# Patient Record
Sex: FEMALE | Race: WHITE | NOT HISPANIC OR LATINO | Employment: FULL TIME | URBAN - METROPOLITAN AREA
[De-identification: names, ages, dates, MRNs, and addresses within clinical notes are randomized per-mention and may not be internally consistent; named-entity substitution may affect disease eponyms.]

---

## 2017-03-02 ENCOUNTER — TELEPHONE (OUTPATIENT)
Dept: INTERNAL MEDICINE | Facility: CLINIC | Age: 32
End: 2017-03-02

## 2017-03-02 NOTE — TELEPHONE ENCOUNTER
Prior Authorization Request    1. Prior Authorization for the medication gabapentin        Requesting Provider:Jesus Macias          Pt name: Rachana Senior        Pt : 1985        Pt MRN: [id number]        Last Office Visit: 2016           Insurance: Payor: MEDICA / Plan: MEDICA [place] / Product Type: HMO /         Insurance ID Number: [unfilled]        Prior Auth Contact Phone number:            To be completed by provider:     2.   Refuse or Start Prior Auth:  Please start Prior Auth.       If requesting prior auth initiation:     Reasons why other medications are not adequate substitutions:     send info that incld dx for this med- both anxiety and chronic pain

## 2017-03-02 NOTE — TELEPHONE ENCOUNTER
Prior authorization    Medication name gabapentin  Insurance medica  Insurance ID number [id number]  Prior authorization faxed through cover my meds

## 2017-03-29 DIAGNOSIS — F41.1 GENERALIZED ANXIETY DISORDER: ICD-10-CM

## 2017-03-29 DIAGNOSIS — M54.50 BILATERAL LOW BACK PAIN WITHOUT SCIATICA, UNSPECIFIED CHRONICITY: ICD-10-CM

## 2017-03-29 NOTE — TELEPHONE ENCOUNTER
gabapentin (NEURONTIN) 300 MG capsule      Last Written Prescription Date:  10/11/16  Last Fill Quantity: 270,   # refills: 5  Last Office Visit with [facility], [facility] or [facility] prescribing provider: 10/11/16  Future Office visit:       Routing refill request to provider for review/approval because:  Drug not on the [facility], [facility] or [facility] refill protocol or controlled substance

## 2017-03-29 NOTE — LETTER
[facility]  [address] [address]  [phone] (appt)  [phone] (nurse line)    April 6, 2017      Rachana Senior                                                                                                                                [address] [place] [address]              Dear Rachana,    In reviewing your recent refill request for Gabapentin, it was noted that you are due for the following:     Follow-up appointment with your physician within the next 30 days     Approval for a 30-day supply of the medication has been sent to your pharmacy.  Additional refills will be approved during your follow up visit.     Please contact our office at [phone] to schedule your doctor's appointment.          Sincerely,      [facility]

## 2017-03-30 RX ORDER — GABAPENTIN 300 MG/1
900 CAPSULE ORAL 3 TIMES DAILY
Qty: 270 CAPSULE | Refills: 0 | Status: SHIPPED | OUTPATIENT
Start: 2017-03-30 | End: 2017-04-20

## 2017-04-03 RX ORDER — TRIAMCINOLONE ACETONIDE 1 MG/G
CREAM TOPICAL 2 TIMES DAILY
Qty: 45 G | Refills: 6 | OUTPATIENT
Start: 2017-04-03

## 2017-04-03 NOTE — TELEPHONE ENCOUNTER
triamcinolone 0.1 cream 15gm      Last Written Prescription Date: na  Last Fill Quantity: na,  # refills: na   Last Office Visit with [facility] or [facility] prescribing provider: 10/11/16

## 2017-04-03 NOTE — TELEPHONE ENCOUNTER
Routing RX to PCP due to TRIAMCINOLONE ACETONIDE 0.1 % EX CREA  Is not a current medication on pt's med list.

## 2017-04-20 ENCOUNTER — OFFICE VISIT (OUTPATIENT)
Dept: INTERNAL MEDICINE | Facility: CLINIC | Age: 32
End: 2017-04-20
Payer: COMMERCIAL

## 2017-04-20 VITALS
OXYGEN SATURATION: 98 % | WEIGHT: 118.4 LBS | DIASTOLIC BLOOD PRESSURE: 90 MMHG | BODY MASS INDEX: 18.58 KG/M2 | SYSTOLIC BLOOD PRESSURE: 128 MMHG | TEMPERATURE: 98.3 F | HEIGHT: 67 IN | HEART RATE: 87 BPM

## 2017-04-20 DIAGNOSIS — L20.9 ATOPIC DERMATITIS, UNSPECIFIED TYPE: ICD-10-CM

## 2017-04-20 DIAGNOSIS — J45.30 MILD PERSISTENT ASTHMA WITHOUT COMPLICATION: ICD-10-CM

## 2017-04-20 DIAGNOSIS — J30.2 SEASONAL ALLERGIC RHINITIS, UNSPECIFIED ALLERGIC RHINITIS TRIGGER: ICD-10-CM

## 2017-04-20 DIAGNOSIS — M54.50 BILATERAL LOW BACK PAIN WITHOUT SCIATICA, UNSPECIFIED CHRONICITY: ICD-10-CM

## 2017-04-20 DIAGNOSIS — F41.1 GENERALIZED ANXIETY DISORDER: Primary | ICD-10-CM

## 2017-04-20 PROCEDURE — 99214 OFFICE O/P EST MOD 30 MIN: CPT | Performed by: INTERNAL MEDICINE

## 2017-04-20 RX ORDER — FLUTICASONE PROPIONATE 50 MCG
1-2 SPRAY, SUSPENSION (ML) NASAL DAILY
Qty: 1 BOTTLE | Refills: 11 | Status: SHIPPED | OUTPATIENT
Start: 2017-04-20 | End: 2018-04-11

## 2017-04-20 RX ORDER — TRIAMCINOLONE ACETONIDE 1 MG/G
CREAM TOPICAL
COMMUNITY
Start: 2017-03-01 | End: 2017-04-20

## 2017-04-20 RX ORDER — CITALOPRAM HYDROBROMIDE 20 MG/1
TABLET ORAL
Qty: 30 TABLET | Refills: 1 | Status: SHIPPED | OUTPATIENT
Start: 2017-04-20 | End: 2017-06-07

## 2017-04-20 RX ORDER — GABAPENTIN 300 MG/1
900 CAPSULE ORAL 3 TIMES DAILY
Qty: 270 CAPSULE | Refills: 5 | Status: SHIPPED | OUTPATIENT
Start: 2017-04-20 | End: 2017-09-12

## 2017-04-20 RX ORDER — TRIAMCINOLONE ACETONIDE 1 MG/ML
LOTION TOPICAL
Qty: 60 ML | Refills: 11 | Status: SHIPPED | OUTPATIENT
Start: 2017-04-20 | End: 2019-10-22

## 2017-04-20 ASSESSMENT — ANXIETY QUESTIONNAIRES
7. FEELING AFRAID AS IF SOMETHING AWFUL MIGHT HAPPEN: SEVERAL DAYS
5. BEING SO RESTLESS THAT IT IS HARD TO SIT STILL: SEVERAL DAYS
2. NOT BEING ABLE TO STOP OR CONTROL WORRYING: NEARLY EVERY DAY
3. WORRYING TOO MUCH ABOUT DIFFERENT THINGS: NEARLY EVERY DAY
6. BECOMING EASILY ANNOYED OR IRRITABLE: MORE THAN HALF THE DAYS
1. FEELING NERVOUS, ANXIOUS, OR ON EDGE: MORE THAN HALF THE DAYS
IF YOU CHECKED OFF ANY PROBLEMS ON THIS QUESTIONNAIRE, HOW DIFFICULT HAVE THESE PROBLEMS MADE IT FOR YOU TO DO YOUR WORK, TAKE CARE OF THINGS AT HOME, OR GET ALONG WITH OTHER PEOPLE: SOMEWHAT DIFFICULT
GAD7 TOTAL SCORE: 13

## 2017-04-20 ASSESSMENT — PATIENT HEALTH QUESTIONNAIRE - PHQ9: 5. POOR APPETITE OR OVEREATING: SEVERAL DAYS

## 2017-04-20 NOTE — NURSING NOTE
"Chief Complaint   Patient presents with     Asthma       Initial /90  Pulse 87  Temp 98.3  F (36.8  C) (Oral)  Ht 5' 6.5\" (1.689 m)  Wt 118 lb 6.4 oz (53.7 kg)  LMP 04/12/2017  SpO2 98%  BMI 18.82 kg/m2 Estimated body mass index is 18.82 kg/(m^2) as calculated from the following:    Height as of this encounter: 5' 6.5\" (1.689 m).    Weight as of this encounter: 118 lb 6.4 oz (53.7 kg).  Medication Reconciliation: complete    "

## 2017-04-20 NOTE — PROGRESS NOTES
"  SUBJECTIVE:                                                    Rachana Senior is a 31 year old female who presents to clinic today for the following health issues:    Asthma Follow-Up    Was ACT completed today?    Yes    ACT Total Scores 4/20/2017   ACT TOTAL SCORE (Goal Greater than or Equal to 20) 16   In the past 12 months, how many times did you visit the emergency room for your asthma without being admitted to the hospital? 0   In the past 12 months, how many times were you hospitalized overnight because of your asthma? 0       Recent asthma triggers that patient is dealing with: None  Hasn't been that compliant with using her controller medication       Amount of exercise or physical activity: None    Problems taking medications regularly: No    Medication side effects: none    Diet: regular (no restrictions)      Anxiety Follow-Up    Status since last visit: Worsened     Other associated symptoms:None    Complicating factors:   Significant life event: yes- constant stress regarding kids  Current substance abuse: None  Depression symptoms: No  JESSICA-7 SCORE 7/28/2016 4/20/2017   Total Score 20 13        GAD7     Problem list and histories reviewed & adjusted, as indicated.  Additional history: as documented    Labs reviewed in EPIC    Reviewed and updated as needed this visit by clinical staff  Tobacco  Allergies  Soc Hx      Reviewed and updated as needed this visit by Provider         ROS:  Constitutional, HEENT, cardiovascular, pulmonary, gi and gu systems are negative, except as otherwise noted.    OBJECTIVE:                                                    /90  Pulse 87  Temp 98.3  F (36.8  C) (Oral)  Ht 5' 6.5\" (1.689 m)  Wt 118 lb 6.4 oz (53.7 kg)  LMP 04/12/2017  SpO2 98%  BMI 18.82 kg/m2  Body mass index is 18.82 kg/(m^2).  GENERAL APPEARANCE: alert and no distress  HENT: nose and mouth without ulcers or lesions  NECK: no adenopathy, no asymmetry, masses, or scars and thyroid normal " to palpation  RESP: lungs clear to auscultation - no rales, rhonchi or wheezes  CV: regular rates and rhythm, normal S1 S2, no S3 or S4 and no murmur, click or rub  MS: extremities normal- no gross deformities noted  PSYCH: affect normal/bright and anxious    Diagnostic test results:  none      ASSESSMENT/PLAN:                                                    1. Generalized anxiety disorder  Start SSRI  F/u 1-2 mo   - citalopram (CELEXA) 20 MG tablet; Take 1/2 tablet (10 mg) for 1-2 weeks, then increase to 1 tablet orally daily  Dispense: 30 tablet; Refill: 1  - gabapentin (NEURONTIN) 300 MG capsule; Take 3 capsules (900 mg) by mouth 3 times daily  Dispense: 270 capsule; Refill: 5    2. Mild persistent asthma without complication  Needs to be more compliant with use of med  Recheck later this year  - mometasone-formoterol (DULERA) 200-5 MCG/ACT oral inhaler; Inhale 2 puffs into the lungs 2 times daily  Dispense: 1 Inhaler; Refill: 5    3. Seasonal allergic rhinitis, unspecified allergic rhinitis trigger  - fluticasone (FLONASE) 50 MCG/ACT spray; Spray 1-2 sprays into both nostrils daily  Dispense: 1 Bottle; Refill: 11    4. Bilateral low back pain without sciatica, unspecified chronicity  - gabapentin (NEURONTIN) 300 MG capsule; Take 3 capsules (900 mg) by mouth 3 times daily  Dispense: 270 capsule; Refill: 5      Follow up with Provider - as above     Jesus Macias MD  [facility]

## 2017-04-20 NOTE — MR AVS SNAPSHOT
"              After Visit Summary   2017    Rachana Senior    MRN: [id number]           Patient Information     Date Of Birth          1985        Visit Information        Provider Department      2017 10:40 AM Jesus Macias MD [facility]        Today's Diagnoses     Generalized anxiety disorder    -  1       Follow-ups after your visit        Who to contact     If you have questions or need follow up information about today's clinic visit or your schedule please contact [facility] directly at [phone].  Normal or non-critical lab and imaging results will be communicated to you by [employer], letter or phone within 4 business days after the clinic has received the results. If you do not hear from us within 7 days, please contact the clinic through [employer] or phone. If you have a critical or abnormal lab result, we will notify you by phone as soon as possible.  Submit refill requests through [employer] or call your pharmacy and they will forward the refill request to us. Please allow 3 business days for your refill to be completed.          Additional Information About Your Visit        MyChart Information     [employer] lets you send messages to your doctor, view your test results, renew your prescriptions, schedule appointments and more. To sign up, go to [email or web address] . Click on \"Log in\" on the left side of the screen, which will take you to the Welcome page. Then click on \"Sign up Now\" on the right side of the page.     You will be asked to enter the access code listed below, as well as some personal information. Please follow the directions to create your username and password.     Your access code is: [email or web address]  Expires: 2017 11:23 AM     Your access code will  in 90 days. If you need help or a new code, please call your [facility] or [phone].        Care EveryWhere ID     This is your Care EveryWhere ID. This " "could be used by other organizations to access your [facility] medical records  [id number]        Your Vitals Were     Pulse Temperature Height Last Period Pulse Oximetry BMI (Body Mass Index)    87 98.3  F (36.8  C) (Oral) 5' 6.5\" (1.689 m) 04/12/2017 98% 18.82 kg/m2       Blood Pressure from Last 3 Encounters:   04/20/17 128/90   10/11/16 106/60   04/22/16 119/76    Weight from Last 3 Encounters:   04/20/17 118 lb 6.4 oz (53.7 kg)   10/11/16 124 lb 14.4 oz (56.7 kg)   04/22/16 120 lb (54.4 kg)              Today, you had the following     No orders found for display         Today's Medication Changes          These changes are accurate as of: 4/20/17 11:23 AM.  If you have any questions, ask your nurse or doctor.               Start taking these medicines.        Dose/Directions    citalopram 20 MG tablet   Commonly known as:  celeXA   Used for:  Generalized anxiety disorder   Started by:  Jesus Macias MD        Take 1/2 tablet (10 mg) for 1-2 weeks, then increase to 1 tablet orally daily   Quantity:  30 tablet   Refills:  1            Where to get your medicines      These medications were sent to [facility] [address] [place] [address] [address] AT [facility] [address] [place]  [address] , [facility] [address]     Phone:  [phone]     citalopram 20 MG tablet                Primary Care Provider Office Phone # Fax #    Jesus Macias [phone]       [facility] [address]  [facility] [address]        Thank you!     Thank you for choosing [facility]  for your care. Our goal is always to provide you with excellent care. Hearing back from our patients is one way we can continue to improve our services. Please take a few minutes to complete the written survey that you may receive in the mail after your visit with us. Thank you!             Your Updated Medication List - Protect others around you: Learn " how to safely use, store and throw away your medicines at www.disposemymeds.org.          This list is accurate as of: 4/20/17 11:23 AM.  Always use your most recent med list.                   Brand Name Dispense Instructions for use    citalopram 20 MG tablet    celeXA    30 tablet    Take 1/2 tablet (10 mg) for 1-2 weeks, then increase to 1 tablet orally daily       fluticasone 50 MCG/ACT spray    FLONASE    1 Bottle    Spray 1-2 sprays into both nostrils daily       gabapentin 300 MG capsule    NEURONTIN    270 capsule    Take 3 capsules (900 mg) by mouth 3 times daily       ketotifen 0.025 % Soln ophthalmic solution    ZADITOR    1 Bottle    Place 1 drop into both eyes every 12 hours       mometasone-formoterol 200-5 MCG/ACT oral inhaler    DULERA    1 Inhaler    Inhale 2 puffs into the lungs 2 times daily       triamcinolone 0.1 % cream    KENALOG

## 2017-04-21 ASSESSMENT — ASTHMA QUESTIONNAIRES: ACT_TOTALSCORE: 16

## 2017-04-21 ASSESSMENT — PATIENT HEALTH QUESTIONNAIRE - PHQ9: SUM OF ALL RESPONSES TO PHQ QUESTIONS 1-9: 5

## 2017-04-21 ASSESSMENT — ANXIETY QUESTIONNAIRES: GAD7 TOTAL SCORE: 13

## 2017-05-29 ENCOUNTER — HOSPITAL ENCOUNTER (EMERGENCY)
Facility: CLINIC | Age: 32
Discharge: HOME | End: 2017-05-29
Attending: CLINICAL NURSE SPECIALIST | Admitting: CLINICAL NURSE SPECIALIST
Payer: COMMERCIAL

## 2017-05-29 VITALS
SYSTOLIC BLOOD PRESSURE: 123 MMHG | BODY MASS INDEX: 19.29 KG/M2 | DIASTOLIC BLOOD PRESSURE: 68 MMHG | WEIGHT: 120 LBS | OXYGEN SATURATION: 99 % | HEIGHT: 66 IN | TEMPERATURE: 98.6 F

## 2017-05-29 DIAGNOSIS — R20.2 PARESTHESIAS: ICD-10-CM

## 2017-05-29 PROCEDURE — 99282 EMERGENCY DEPT VISIT SF MDM: CPT

## 2017-05-29 RX ORDER — NAPROXEN 500 MG/1
500 TABLET ORAL 2 TIMES DAILY PRN
Qty: 30 TABLET | Refills: 1 | Status: SHIPPED | OUTPATIENT
Start: 2017-05-29 | End: 2017-12-12

## 2017-05-29 NOTE — LETTER
EMERGENCY DEPARTMENT  [address] [address]  [phone]    May 29, 2017    Rachana Senior  [address] [place] [address]  [phone] (home) none (work)    : 1985      To Whom it may concern:    Rachana Senior was seen in our Emergency Department today, May 29, 2017.  I expect her condition to improve over the next 1 day.  She may return to work when improved. Must wear right wrist splint for one week while working.    Sincerely,        Staci Soler

## 2017-05-29 NOTE — ED AVS SNAPSHOT
Emergency Department    [address] [place] [address]    Phone:  [phone]    Fax:  [phone]                                       Rachana Senior   MRN: [id number]    Department:   Emergency Department   Date of Visit:  5/29/2017           After Visit Summary Signature Page     I have received my discharge instructions, and my questions have been answered. I have discussed any challenges I see with this plan with the nurse or doctor.    ..........................................................................................................................................  Patient/Patient Representative Signature      ..........................................................................................................................................  Patient Representative Print Name and Relationship to Patient    ..................................................               ................................................  Date                                            Time    ..........................................................................................................................................  Reviewed by Signature/Title    ...................................................              ..............................................  Date                                                            Time

## 2017-05-29 NOTE — ED AVS SNAPSHOT
"  Emergency Department    [address] [facility] [address]    Phone:  [phone]    Fax:  [phone]                                       Rachana Senior   MRN: [id number]    Department:   Emergency Department   Date of Visit:  5/29/2017           Patient Information     Date Of Birth          1985        Your diagnoses for this visit were:     Paresthesias right hand       You were seen by Staci Soler APRN CNP.      Follow-up Information     Follow up with Jesus Macias MD In 1 week.    Specialty:  Internal Medicine    Why:  As needed    Contact information:    [facility]  [address] [facility] [address] [phone]          Discharge Instructions         Paraesthesias  Paraesthesia refers to a burning or prickling sensation that is sometimes felt in the hands, arms, legs or feet. It can also occur in other parts of the body. It can also feel like tingling or numbness, skin crawling, or itching. The feeling is not comfortable, but it is not painful. (The \"pins and needles\" feeling that happens when a foot or hand \"falls asleep\" is a temporary paraesthesia.)  Paraesthesias that last or come and go may be caused by medical issues that need to be treated. These include stroke, bulging disk (pressing on a nerve), a trapped nerve), vitamin deficiencies, or even certain medicines.  Tests are often done. These tests may include blood tests, X-ray, CT (computerized tomography) scan, or a muscle test (electromyography). Depending on the cause, treatment may include physical therapy.  Home care    Tell the healthcare provider about all medicines you take. This includes prescription and over-the-counter medicines, vitamins, and herbs. Ask if any of the medicines may be causing your problems. Do not make any changes to prescription medicines without talking to your healthcare provider first.    You may be prescribed medicines to help relieve the tingling " feeling or for pain. Take all medicines as directed.    A numb hand or foot may be more prone to injury. To help protect it:    Always use oven mitts.    Test water with an unaffected hand or foot.    Use caution when trimming nails. File sharp areas.    Wear shoes that fit well to avoid pressure points, blisters, and ulcers.    Inspect your hands and feet carefully (including the soles of your feet and between your toes) at least once a week. If you see red areas, sores, or other problems, tell your healthcare provider.  Follow-up care  Follow up with your doctor or as advised by our staff. You may need further testing or evaluation.  When to seek medical advice  Call your healthcare provider right away if any of the following occur:    Numbness or weakness of the face, one arm, or one leg    Slurred speech, confusion, trouble speaking, walking, or seeing    Severe headache, fainting spell, dizziness, or seizure    Chest, arm, neck, or upper back pain    Loss of bladder or bowel control    Open wound with redness, swelling, or pus    1572-7264 [employer]. [address] [place] [address]. All rights reserved. This information is not intended as a substitute for professional medical care. Always follow your healthcare professional's instructions.          Discharge References/Attachments     CARPAL TUNNEL SYNDROME (ENGLISH)      24 Hour Appointment Hotline       To make an appointment at any [facility] clinic, call [phone] ([phone]). If you don't have a family doctor or clinic, we will help you find one. [facility] clinics are conveniently located to serve the needs of you and your family.             Review of your medicines      START taking        Dose / Directions Last dose taken    naproxen 500 MG tablet   Commonly known as:  NAPROSYN   Dose:  500 mg   Quantity:  30 tablet        Take 1 tablet (500 mg) by mouth 2 times daily as needed for moderate pain   Refills:  1           Our records show that you are taking the medicines listed below. If these are incorrect, please call your family doctor or clinic.        Dose / Directions Last dose taken    citalopram 20 MG tablet   Commonly known as:  celeXA   Quantity:  30 tablet        Take 1/2 tablet (10 mg) for 1-2 weeks, then increase to 1 tablet orally daily   Refills:  1        fluticasone 50 MCG/ACT spray   Commonly known as:  FLONASE   Dose:  1-2 spray   Quantity:  1 Bottle        Spray 1-2 sprays into both nostrils daily   Refills:  11        gabapentin 300 MG capsule   Commonly known as:  NEURONTIN   Dose:  900 mg   Quantity:  270 capsule        Take 3 capsules (900 mg) by mouth 3 times daily   Refills:  5        ketotifen 0.025 % Soln ophthalmic solution   Commonly known as:  ZADITOR   Dose:  1 drop   Quantity:  1 Bottle        Place 1 drop into both eyes every 12 hours   Refills:  11        mometasone-formoterol 200-5 MCG/ACT oral inhaler   Commonly known as:  DULERA   Dose:  2 puff   Quantity:  1 Inhaler        Inhale 2 puffs into the lungs 2 times daily   Refills:  5        triamcinolone 0.1 % lotion   Commonly known as:  KENALOG   Quantity:  60 mL        Apply sparingly to affected area two times daily as needed.   Refills:  11                Prescriptions were sent or printed at these locations (1 Prescription)                   Other Prescriptions                Printed at Department/Unit printer (1 of 1)         naproxen (NAPROSYN) 500 MG tablet                Orders Needing Specimen Collection     None      Pending Results     No orders found from 5/27/2017 to 5/30/2017.            Pending Culture Results     No orders found from 5/27/2017 to 5/30/2017.            Pending Results Instructions     If you had any lab results that were not finalized at the time of your Discharge, you can call the ED Lab Result RN at [phone]. You will be contacted by this team for any positive Lab results or changes in treatment. The nurses  are available 7 days a week from 10A to 6:30P.  You can leave a message 24 hours per day and they will return your call.        Test Results From Your Hospital Stay               Clinical Quality Measure: Blood Pressure Screening     Your blood pressure was checked while you were in the emergency department today. The last reading we obtained was  BP: 123/68 . Please read the guidelines below about what these numbers mean and what you should do about them.  If your systolic blood pressure (the top number) is less than 120 and your diastolic blood pressure (the bottom number) is less than 80, then your blood pressure is normal. There is nothing more that you need to do about it.  If your systolic blood pressure (the top number) is 120-139 or your diastolic blood pressure (the bottom number) is 80-89, your blood pressure may be higher than it should be. You should have your blood pressure rechecked within a year by a primary care provider.  If your systolic blood pressure (the top number) is 140 or greater or your diastolic blood pressure (the bottom number) is 90 or greater, you may have high blood pressure. High blood pressure is treatable, but if left untreated over time it can put you at risk for heart attack, stroke, or kidney failure. You should have your blood pressure rechecked by a primary care provider within the next 4 weeks.  If your provider in the emergency department today gave you specific instructions to follow-up with your doctor or provider even sooner than that, you should follow that instruction and not wait for up to 4 weeks for your follow-up visit.        Thank you for choosing [facility]       Thank you for choosing [facility] for your care. Our goal is always to provide you with excellent care. Hearing back from our patients is one way we can continue to improve our services. Please take a few minutes to complete the written survey that you may receive in the mail after you visit with us. Thank  "you!        [employer] Information     [employer] lets you send messages to your doctor, view your test results, renew your prescriptions, schedule appointments and more. To sign up, go to [email or web address] . Click on \"Log in\" on the left side of the screen, which will take you to the Welcome page. Then click on \"Sign up Now\" on the right side of the page.     You will be asked to enter the access code listed below, as well as some personal information. Please follow the directions to create your username and password.     Your access code is: [email or web address]  Expires: 2017 11:23 AM     Your access code will  in 90 days. If you need help or a new code, please call your [facility] clinic or [phone].        Care EveryWhere ID     This is your Care EveryWhere ID. This could be used by other organizations to access your [facility] medical records  [id number]        After Visit Summary       This is your record. Keep this with you and show to your community pharmacist(s) and doctor(s) at your next visit.                  "

## 2017-05-30 NOTE — DISCHARGE INSTRUCTIONS
"  Paraesthesias  Paraesthesia refers to a burning or prickling sensation that is sometimes felt in the hands, arms, legs or feet. It can also occur in other parts of the body. It can also feel like tingling or numbness, skin crawling, or itching. The feeling is not comfortable, but it is not painful. (The \"pins and needles\" feeling that happens when a foot or hand \"falls asleep\" is a temporary paraesthesia.)  Paraesthesias that last or come and go may be caused by medical issues that need to be treated. These include stroke, bulging disk (pressing on a nerve), a trapped nerve), vitamin deficiencies, or even certain medicines.  Tests are often done. These tests may include blood tests, X-ray, CT (computerized tomography) scan, or a muscle test (electromyography). Depending on the cause, treatment may include physical therapy.  Home care    Tell the healthcare provider about all medicines you take. This includes prescription and over-the-counter medicines, vitamins, and herbs. Ask if any of the medicines may be causing your problems. Do not make any changes to prescription medicines without talking to your healthcare provider first.    You may be prescribed medicines to help relieve the tingling feeling or for pain. Take all medicines as directed.    A numb hand or foot may be more prone to injury. To help protect it:    Always use oven mitts.    Test water with an unaffected hand or foot.    Use caution when trimming nails. File sharp areas.    Wear shoes that fit well to avoid pressure points, blisters, and ulcers.    Inspect your hands and feet carefully (including the soles of your feet and between your toes) at least once a week. If you see red areas, sores, or other problems, tell your healthcare provider.  Follow-up care  Follow up with your doctor or as advised by our staff. You may need further testing or evaluation.  When to seek medical advice  Call your healthcare provider right away if any of the " following occur:    Numbness or weakness of the face, one arm, or one leg    Slurred speech, confusion, trouble speaking, walking, or seeing    Severe headache, fainting spell, dizziness, or seizure    Chest, arm, neck, or upper back pain    Loss of bladder or bowel control    Open wound with redness, swelling, or pus    7814-7000 [employer]. [address] [place] [address]. All rights reserved. This information is not intended as a substitute for professional medical care. Always follow your healthcare professional's instructions.

## 2017-05-30 NOTE — ED PROVIDER NOTES
"  History   Chief Complaint:  Hand Pain     HPI   Rachana Senior is a 31 year old female who presents to the emergency department for evaluation of right hand pain. The patient indicates that about a week ago she hit her right hand on a steel rack about a week ago and she started having discomfort a few days later. She notes that she is right handed and she uses the hand often. She denies any other injuries.     Allergies:  NKDA    Medications:    Celexa   Flonase   Dulera   Gabapentin   Kenalog   Zaditor     Past Medical History:    Depression   Generalized anxiety   Seizures   Asthma     Past Surgical History:    ENT surgery   Gyn surgery     Family History:    Cancer    Social History:  Current everyday smoker   Negative for alcohol use.  Marital Status:  Single     Review of Systems   Musculoskeletal:        Hand pain   All other systems reviewed and are negative.    Physical Exam   First Vitals:  BP: 123/68  Heart Rate: 77  Temp: 98.6  F (37  C)  Height: 167.6 cm (5' 6\")  Weight: 54.4 kg (120 lb)  SpO2: 99 %    Physical Exam  Physical Exam   Constitutional: Pt appears well-developed and well-nourished. Non toxic appearing.   ENT: Oropharynx is moist.   Eyes: EOMs intact. Pupils are equal, round, and reactive to light.    Cardiovascular: Regular rate and rhythm.   Pulmonary/Chest: No respiratory distress.     Musc: Able to extend and flex fingers without difficulty, medial, radial and ulnar nerve intact, 2+ radial pulses intact, mildly diminished on the lateral side of left index finger, positive phalen's test   Neurological: No focal deficits.   Skin: Skin is warm, dry.    Emergency Department Course   Emergency Department Course:  Nursing notes and vitals reviewed. I performed an exam of the patient as documented above.     Findings and plan explained to the Patient. Patient discharged home with instructions regarding supportive care, medications, and reasons to return. The importance of close follow-up was " reviewed. The patient was prescribed Naproxen.     Impression & Plan    Medical Decision Making:  Rachana Senior is a 31 year old female presents for evaluation of right hand/finger paresthesias.  Signs and symptoms are consistent with a possible inflammation from hit of hand against bag powell versus carpal tunnel syndrome.  A broad differential was considered including sprain, strain, fracture, tendon rupture, nerve impingement/compromise, referred pain. Supportive outpatient management is indicated.  Rest, ice, and elevation treatment was discussed with the patient.  Velcro wrist splint for comfort.  Close follow-up with patient's primary care physician per discharge precautions in one week as needed. Paresthesias discharge instructions given for home.     Diagnosis:    ICD-10-CM    1. Paresthesias R20.2     right hand     Discharge Medications:  New Prescriptions    NAPROXEN (NAPROSYN) 500 MG TABLET    Take 1 tablet (500 mg) by mouth 2 times daily as needed for moderate pain     Danae Ruiz  5/29/2017    EMERGENCY DEPARTMENT    I, Danae Said, am serving as a scribe on 5/29/2017 at 9:42 PM to personally document services performed by Staci Soler, NP based on my observations and the provider's statements to me.          Staci Soler, APRN CNP  05/29/17 9589

## 2017-06-07 DIAGNOSIS — F41.1 GENERALIZED ANXIETY DISORDER: ICD-10-CM

## 2017-06-07 RX ORDER — CITALOPRAM HYDROBROMIDE 20 MG/1
TABLET ORAL
Qty: 30 TABLET | Refills: 9 | Status: SHIPPED | OUTPATIENT
Start: 2017-06-07 | End: 2017-09-12

## 2017-07-01 ENCOUNTER — HEALTH MAINTENANCE LETTER (OUTPATIENT)
Age: 32
End: 2017-07-01

## 2017-08-20 DIAGNOSIS — M54.50 BILATERAL LOW BACK PAIN WITHOUT SCIATICA, UNSPECIFIED CHRONICITY: ICD-10-CM

## 2017-08-20 DIAGNOSIS — F41.1 GENERALIZED ANXIETY DISORDER: ICD-10-CM

## 2017-08-22 RX ORDER — GABAPENTIN 300 MG/1
CAPSULE ORAL
Qty: 270 CAPSULE | Refills: 0 | OUTPATIENT
Start: 2017-08-22

## 2017-08-22 NOTE — TELEPHONE ENCOUNTER
Gabapentin      Last Written Prescription Date:  4/20/17  Last Fill Quantity: 270,   # refills: 5  [facility] provider: 4/20/17  Future Office visit:       Routing refill request to provider for review/approval because:  Drug not on the [facility] refill protocol or controlled substance

## 2017-08-22 NOTE — TELEPHONE ENCOUNTER
Last fill (from last supply) should be now. New rx not fillable until sept  Pt hasn't f/u -so a visit is needed

## 2017-09-10 ENCOUNTER — TELEPHONE (OUTPATIENT)
Dept: INTERNAL MEDICINE | Facility: CLINIC | Age: 32
End: 2017-09-10

## 2017-09-10 NOTE — TELEPHONE ENCOUNTER
Patient is returning a phone call from the clinic. There are no notes in her chart. Please call patient to discuss. She can be reached tomorrow, 9/11/17, before 2pm or after 3pm.    Luanne ULRICH  Central Scheduler

## 2017-09-11 NOTE — TELEPHONE ENCOUNTER
Returned patients call I am not sure who called her or why.  Left message to call back if she has any more information or any questions

## 2017-09-12 ENCOUNTER — OFFICE VISIT (OUTPATIENT)
Dept: INTERNAL MEDICINE | Facility: CLINIC | Age: 32
End: 2017-09-12
Payer: COMMERCIAL

## 2017-09-12 VITALS
RESPIRATION RATE: 16 BRPM | HEIGHT: 66 IN | DIASTOLIC BLOOD PRESSURE: 70 MMHG | WEIGHT: 127 LBS | TEMPERATURE: 98.9 F | OXYGEN SATURATION: 99 % | HEART RATE: 100 BPM | BODY MASS INDEX: 20.41 KG/M2 | SYSTOLIC BLOOD PRESSURE: 120 MMHG

## 2017-09-12 DIAGNOSIS — J30.89 CHRONIC ALLERGIC RHINITIS DUE TO OTHER ALLERGIC TRIGGER, UNSPECIFIED SEASONALITY: Primary | ICD-10-CM

## 2017-09-12 DIAGNOSIS — M54.50 BILATERAL LOW BACK PAIN WITHOUT SCIATICA, UNSPECIFIED CHRONICITY: ICD-10-CM

## 2017-09-12 DIAGNOSIS — F41.1 GENERALIZED ANXIETY DISORDER: ICD-10-CM

## 2017-09-12 DIAGNOSIS — J45.30 MILD PERSISTENT ASTHMA WITHOUT COMPLICATION: ICD-10-CM

## 2017-09-12 DIAGNOSIS — H10.45 CHRONIC ALLERGIC CONJUNCTIVITIS: ICD-10-CM

## 2017-09-12 PROCEDURE — 99214 OFFICE O/P EST MOD 30 MIN: CPT | Performed by: INTERNAL MEDICINE

## 2017-09-12 RX ORDER — CITALOPRAM HYDROBROMIDE 40 MG/1
40 TABLET ORAL DAILY
Qty: 90 TABLET | Refills: 1 | Status: SHIPPED | OUTPATIENT
Start: 2017-09-12 | End: 2017-12-12

## 2017-09-12 RX ORDER — ALBUTEROL SULFATE 90 UG/1
2 AEROSOL, METERED RESPIRATORY (INHALATION) EVERY 6 HOURS PRN
Qty: 1 INHALER | Refills: 11 | Status: SHIPPED | OUTPATIENT
Start: 2017-09-12 | End: 2019-10-22

## 2017-09-12 RX ORDER — GABAPENTIN 300 MG/1
900 CAPSULE ORAL 3 TIMES DAILY
Qty: 270 CAPSULE | Refills: 1 | Status: SHIPPED | OUTPATIENT
Start: 2017-09-12 | End: 2017-11-03

## 2017-09-12 RX ORDER — CROMOLYN SODIUM 40 MG/ML
1 SOLUTION/ DROPS OPHTHALMIC 4 TIMES DAILY
Qty: 1 BOTTLE | Refills: 11 | Status: SHIPPED | OUTPATIENT
Start: 2017-09-12 | End: 2018-11-14

## 2017-09-12 ASSESSMENT — PATIENT HEALTH QUESTIONNAIRE - PHQ9: SUM OF ALL RESPONSES TO PHQ QUESTIONS 1-9: 1

## 2017-09-12 NOTE — PROGRESS NOTES
"  SUBJECTIVE:   Rachana Senior is a 32 year old female who presents to clinic today for the following health issues:    1. Anxiety- feels it might be getting worse  On citalopram 20 mg. having more issues with insomnia. Maybe anxiety related?    2. Allergic Rhinitis  Taking flonase for nasal symptoms and zaditor for ocular symptoms   Not helping much- unsure of triggers    3. Chronic LBP    Problem list and histories reviewed & adjusted, as indicated.  Additional history: as documented    Labs reviewed in EPIC    Reviewed and updated as needed this visit by clinical staff  Tobacco  Allergies       Reviewed and updated as needed this visit by Provider         ROS:  Constitutional, HEENT, cardiovascular, pulmonary, gi and gu systems are negative, except as otherwise noted.      OBJECTIVE:                                                    /70 (BP Location: Right arm, Cuff Size: Adult Regular)  Pulse 100  Temp 98.9  F (37.2  C) (Oral)  Resp 16  Ht 5' 6\" (1.676 m)  Wt 127 lb (57.6 kg)  SpO2 99%  BMI 20.5 kg/m2  Body mass index is 20.5 kg/(m^2).  GENERAL APPEARANCE: alert, no distress  EYES: conjunctiva/corneas- conjunctival injection OU  HENT: nose and mouth without ulcers or lesions  RESP: lungs clear to auscultation - no rales, rhonchi or wheezes    Diagnostic test results:  none        ASSESSMENT/PLAN:                                                    1. Chronic allergic rhinitis due to other allergic trigger, unspecified seasonality  Add OTC antihistamine of choice  - ALLERGY/ASTHMA ADULT REFERRAL    2. Bilateral low back pain without sciatica, unspecified chronicity  - gabapentin (NEURONTIN) 300 MG capsule; Take 3 capsules (900 mg) by mouth 3 times daily  Dispense: 270 capsule; Refill: 1    3. Generalized anxiety disorder  Increase SSRI dose  - gabapentin (NEURONTIN) 300 MG capsule; Take 3 capsules (900 mg) by mouth 3 times daily  Dispense: 270 capsule; Refill: 1  - citalopram (CELEXA) 40 MG " tablet; Take 1 tablet (40 mg) by mouth daily  Dispense: 90 tablet; Refill: 1    4. Mild persistent asthma without complication  - albuterol (PROAIR HFA/PROVENTIL HFA/VENTOLIN HFA) 108 (90 BASE) MCG/ACT Inhaler; Inhale 2 puffs into the lungs every 6 hours as needed for shortness of breath / dyspnea or wheezing  Dispense: 1 Inhaler; Refill: 11  - ALLERGY/ASTHMA ADULT REFERRAL    5. Chronic allergic conjunctivitis  Add mast cell stabilizer. See eye specialist given ongoing symptoms   - cromolyn (OPTICROM) 4 % ophthalmic solution; Place 1 drop into both eyes 4 times daily  Dispense: 1 Bottle; Refill: 11  - OPHTHALMOLOGY ADULT REFERRAL      Follow up with Provider - 1-2 mo      Jesus Macias MD  [facility]

## 2017-09-12 NOTE — PATIENT INSTRUCTIONS
Can try one of the following over the counter antihistamines:  Loratadine (Claritin)  Fexofenadine (allegra)  Cetirizine (zyrtec)    Can be taken as needed     If you still have symptoms despite using these try adding one of the following nasal steroids available over the counter:  -flonase  -nasacort    These need to be taken daily in order to work

## 2017-09-12 NOTE — MR AVS SNAPSHOT
After Visit Summary   9/12/2017    Rachana Senior    MRN: [id number]           Patient Information     Date Of Birth          1985        Visit Information        Provider Department      9/12/2017 11:40 AM Jesus Macias MD [facility]        Today's Diagnoses     Chronic allergic rhinitis due to other allergic trigger, unspecified seasonality    -  1    Bilateral low back pain without sciatica, unspecified chronicity        Generalized anxiety disorder        Mild persistent asthma without complication        Chronic allergic conjunctivitis          Care Instructions    Can try one of the following over the counter antihistamines:  Loratadine (Claritin)  Fexofenadine (allegra)  Cetirizine (zyrtec)    Can be taken as needed     If you still have symptoms despite using these try adding one of the following nasal steroids available over the counter:  -flonase  -nasacort    These need to be taken daily in order to work             Follow-ups after your visit        Additional Services     ALLERGY/ASTHMA ADULT REFERRAL       Your provider has referred you to: N: [facility] [place] [phone]   [email or web address]  [place] [phone]   [email or web address]    Please be aware that coverage of these services is subject to the terms and limitations of your health insurance plan.  Call member services at your health plan with any benefit or coverage questions.      Please bring the following with you to your appointment:    (1) Any X-Rays, CTs or MRIs which have been performed.  Contact the facility where they were done to arrange for  prior to your scheduled appointment.    (2) List of current medications  (3) This referral request   (4) Any documents/labs given to you for this referral            OPHTHALMOLOGY ADULT REFERRAL       Your provider has referred you to: [facility]: [facility] - [place]  [phone] [email or web address]    Please be aware that coverage of these services is subject to the terms and limitations of your health insurance plan.  Call member services at your health plan with any benefit or coverage questions.      Please bring the following with you to your appointment:    (1) Any X-Rays, CTs or MRIs which have been performed.  Contact the facility where they were done to arrange for  prior to your scheduled appointment.    (2) List of current medications  (3) This referral request   (4) Any documents/labs given to you for this referral                  Who to contact     If you have questions or need follow up information about today's clinic visit or your schedule please contact [facility] directly at [phone].  Normal or non-critical lab and imaging results will be communicated to you by MyChart, letter or phone within 4 business days after the clinic has received the results. If you do not hear from us within 7 days, please contact the clinic through MyChart or phone. If you have a critical or abnormal lab result, we will notify you by phone as soon as possible.  Submit refill requests through [employer] or call your pharmacy and they will forward the refill request to us. Please allow 3 business days for your refill to be completed.          Additional Information About Your Visit        [employer] Information     [employer] lets you send messages to your doctor, view your test results, renew your prescriptions, schedule appointments and more. To sign up, go to [email or web address] . Click on \"Log in\" on the left side of the screen, which will take you to the Welcome page. Then click on \"Sign up Now\" on the right side of the page.     You will be asked to enter the access code listed below, as well as some personal information. Please follow the directions to create your username and password.     Your access code is: [email or web address]  Expires: " "2017 12:31 PM     Your access code will  in 90 days. If you need help or a new code, please call your [facility] or [phone].        Care EveryWhere ID     This is your Care EveryWhere ID. This could be used by other organizations to access your [facility] medical records  [id number]        Your Vitals Were     Pulse Temperature Respirations Height Pulse Oximetry BMI (Body Mass Index)    100 98.9  F (37.2  C) (Oral) 16 5' 6\" (1.676 m) 99% 20.5 kg/m2       Blood Pressure from Last 3 Encounters:   17 120/70   17 123/68   17 128/90    Weight from Last 3 Encounters:   17 127 lb (57.6 kg)   17 120 lb (54.4 kg)   17 118 lb 6.4 oz (53.7 kg)              We Performed the Following     ALLERGY/ASTHMA ADULT REFERRAL     OPHTHALMOLOGY ADULT REFERRAL          Today's Medication Changes          These changes are accurate as of: 17 12:31 PM.  If you have any questions, ask your nurse or doctor.               Start taking these medicines.        Dose/Directions    albuterol 108 (90 BASE) MCG/ACT Inhaler   Commonly known as:  PROAIR HFA/PROVENTIL HFA/VENTOLIN HFA   Used for:  Mild persistent asthma without complication   Started by:  Jesus Macias MD        Dose:  2 puff   Inhale 2 puffs into the lungs every 6 hours as needed for shortness of breath / dyspnea or wheezing   Quantity:  1 Inhaler   Refills:  11       cromolyn 4 % ophthalmic solution   Commonly known as:  OPTICROM   Used for:  Chronic allergic conjunctivitis   Started by:  Jesus Macias MD        Dose:  1 drop   Place 1 drop into both eyes 4 times daily   Quantity:  1 Bottle   Refills:  11            Where to get your medicines      These medications were sent to [facility] [address] [place], [place] - [address] AT [facility] [place]  [address], [facility] [address]     Phone:  [phone]     albuterol 108 (90 BASE) MCG/ACT Inhaler    cromolyn 4 " % ophthalmic solution    gabapentin 300 MG capsule                Primary Care Provider Office Phone # Fax #    Jesus Macias [phone] [address] [facility] [address]        Equal Access to Services     CHRISTIANO GARCIA : Hadii aad ku hadcarlottao Soomaali, waaxda luqadaha, qaybta kaalmada adeegyada, evita mayern magdalena zambrano laChetbobby duenas. So [facility] [phone].    ATENCIÓN: Si habla español, tiene a allen disposición servicios gratuitos de asistencia lingüística. [facility] [phone].    We comply with applicable federal civil rights laws and [place] laws. We do not discriminate on the basis of race, color, national origin, age, disability sex, sexual orientation or gender identity.            Thank you!     Thank you for choosing [facility]  for your care. Our goal is always to provide you with excellent care. Hearing back from our patients is one way we can continue to improve our services. Please take a few minutes to complete the written survey that you may receive in the mail after your visit with us. Thank you!             Your Updated Medication List - Protect others around you: Learn how to safely use, store and throw away your medicines at www.disposemymeds.org.          This list is accurate as of: 9/12/17 12:31 PM.  Always use your most recent med list.                   Brand Name Dispense Instructions for use Diagnosis    albuterol 108 (90 BASE) MCG/ACT Inhaler    PROAIR HFA/PROVENTIL HFA/VENTOLIN HFA    1 Inhaler    Inhale 2 puffs into the lungs every 6 hours as needed for shortness of breath / dyspnea or wheezing    Mild persistent asthma without complication       citalopram 20 MG tablet    celeXA    30 tablet    TAKE ONE-HALF TABLET BY MOUTH ONCE DAILY FOR 1- 2 WEEKS, THEN INCREASE TO ONE TABLET DAILY THEREAFTER    Generalized anxiety disorder       cromolyn 4 % ophthalmic solution    OPTICROM    1 Bottle    Place 1 drop into both eyes 4  times daily    Chronic allergic conjunctivitis       fluticasone 50 MCG/ACT spray    FLONASE    1 Bottle    Spray 1-2 sprays into both nostrils daily    Seasonal allergic rhinitis, unspecified allergic rhinitis trigger       gabapentin 300 MG capsule    NEURONTIN    270 capsule    Take 3 capsules (900 mg) by mouth 3 times daily    Bilateral low back pain without sciatica, unspecified chronicity, Generalized anxiety disorder       ketotifen 0.025 % Soln ophthalmic solution    ZADITOR    1 Bottle    Place 1 drop into both eyes every 12 hours    Chronic allergic conjunctivitis       mometasone-formoterol 200-5 MCG/ACT oral inhaler    DULERA    1 Inhaler    Inhale 2 puffs into the lungs 2 times daily    Mild persistent asthma without complication       naproxen 500 MG tablet    NAPROSYN    30 tablet    Take 1 tablet (500 mg) by mouth 2 times daily as needed for moderate pain        triamcinolone 0.1 % lotion    KENALOG    60 mL    Apply sparingly to affected area two times daily as needed.    Atopic dermatitis, unspecified type

## 2017-09-12 NOTE — NURSING NOTE
"Chief Complaint   Patient presents with     Recheck Medication     Allergies       Initial /70 (BP Location: Right arm, Cuff Size: Adult Regular)  Pulse 100  Temp 98.9  F (37.2  C) (Oral)  Resp 16  Ht 5' 6\" (1.676 m)  Wt 127 lb (57.6 kg)  SpO2 99%  BMI 20.5 kg/m2 Estimated body mass index is 20.5 kg/(m^2) as calculated from the following:    Height as of this encounter: 5' 6\" (1.676 m).    Weight as of this encounter: 127 lb (57.6 kg).  Medication Reconciliation: complete  Jennifer Lopez MA      "

## 2017-09-13 ASSESSMENT — ASTHMA QUESTIONNAIRES: ACT_TOTALSCORE: 19

## 2017-11-03 DIAGNOSIS — F41.1 GENERALIZED ANXIETY DISORDER: ICD-10-CM

## 2017-11-03 DIAGNOSIS — M54.50 BILATERAL LOW BACK PAIN WITHOUT SCIATICA, UNSPECIFIED CHRONICITY: ICD-10-CM

## 2017-11-06 RX ORDER — GABAPENTIN 300 MG/1
CAPSULE ORAL
Qty: 270 CAPSULE | Refills: 0 | Status: SHIPPED | OUTPATIENT
Start: 2017-11-06 | End: 2017-12-09

## 2017-11-06 NOTE — TELEPHONE ENCOUNTER
Gabapentin       Last Written Prescription Date:  9/12/17  Last Fill Quantity: 270,   # refills: 1  Future Office visit:       Routing refill request to provider for review/approval because:  Drug not on the [facility] or [facility] refill protocol or controlled substance

## 2017-12-09 ENCOUNTER — OFFICE VISIT (OUTPATIENT)
Dept: URGENT CARE | Facility: URGENT CARE | Age: 32
End: 2017-12-09
Payer: COMMERCIAL

## 2017-12-09 VITALS
TEMPERATURE: 98 F | HEART RATE: 70 BPM | SYSTOLIC BLOOD PRESSURE: 100 MMHG | RESPIRATION RATE: 16 BRPM | BODY MASS INDEX: 21.79 KG/M2 | DIASTOLIC BLOOD PRESSURE: 60 MMHG | WEIGHT: 135 LBS

## 2017-12-09 DIAGNOSIS — F41.1 GENERALIZED ANXIETY DISORDER: ICD-10-CM

## 2017-12-09 DIAGNOSIS — M54.50 BILATERAL LOW BACK PAIN WITHOUT SCIATICA, UNSPECIFIED CHRONICITY: ICD-10-CM

## 2017-12-09 PROCEDURE — 99213 OFFICE O/P EST LOW 20 MIN: CPT | Performed by: PHYSICIAN ASSISTANT

## 2017-12-09 RX ORDER — GABAPENTIN 300 MG/1
CAPSULE ORAL
Qty: 27 CAPSULE | Refills: 0 | Status: SHIPPED | OUTPATIENT
Start: 2017-12-09 | End: 2017-12-12

## 2017-12-09 NOTE — MR AVS SNAPSHOT
After Visit Summary   12/9/2017    Rachana Senior    MRN: [id number]           Patient Information     Date Of Birth          1985        Visit Information        Provider Department      12/9/2017 5:20 PM Jo Ann Valles PA-C [facility]        Today's Diagnoses     Bilateral low back pain without sciatica, unspecified chronicity        Generalized anxiety disorder          Care Instructions    (M54.5) Bilateral low back pain without sciatica, unspecified chronicity  Comment:   Plan: gabapentin (NEURONTIN) 300 MG capsule            (F41.1) Generalized anxiety disorder  Comment:   Plan: gabapentin (NEURONTIN) 300 MG capsule            Keep follow up with Dr. Macias for 12/12/17.              Follow-ups after your visit        Your next 10 appointments already scheduled     Dec 12, 2017  8:20 AM CST   SHORT with Jesus Macias MD   [facility] ([facility])    [address] [place] [address] [id number] [phone]              Who to contact     If you have questions or need follow up information about today's clinic visit or your schedule please contact [facility] directly at [phone].  Normal or non-critical lab and imaging results will be communicated to you by MyChart, letter or phone within 4 business days after the clinic has received the results. If you do not hear from us within 7 days, please contact the clinic through MyChart or phone. If you have a critical or abnormal lab result, we will notify you by phone as soon as possible.  Submit refill requests through [employer] or call your pharmacy and they will forward the refill request to us. Please allow 3 business days for your refill to be completed.          Additional Information About Your Visit        [employer] Information     [employer] lets you send messages to your doctor, view your test results, renew your  "prescriptions, schedule appointments and more. To sign up, go to [email or web address] . Click on \"Log in\" on the left side of the screen, which will take you to the Welcome page. Then click on \"Sign up Now\" on the right side of the page.     You will be asked to enter the access code listed below, as well as some personal information. Please follow the directions to create your username and password.     Your access code is: [email or web address]  Expires: 2017 11:31 AM     Your access code will  in 90 days. If you need help or a new code, please call your [place] clinic or [phone].        Care EveryWhere ID     This is your Care EveryWhere ID. This could be used by other organizations to access your [place] medical records  [id number]        Your Vitals Were     Pulse Temperature Respirations BMI (Body Mass Index)          70 98  F (36.7  C) 16 21.79 kg/m2         Blood Pressure from Last 3 Encounters:   17 100/60   17 120/70   17 123/68    Weight from Last 3 Encounters:   17 135 lb (61.2 kg)   17 127 lb (57.6 kg)   17 120 lb (54.4 kg)              Today, you had the following     No orders found for display         Today's Medication Changes          These changes are accurate as of: 17  6:14 PM.  If you have any questions, ask your nurse or doctor.               These medicines have changed or have updated prescriptions.        Dose/Directions    gabapentin 300 MG capsule   Commonly known as:  NEURONTIN   This may have changed:  See the new instructions.   Used for:  Bilateral low back pain without sciatica, unspecified chronicity, Generalized anxiety disorder   Changed by:  Jo Ann Valles PA-C        TAKE 3 CAPSULES(900 MG) BY MOUTH THREE TIMES DAILY   Quantity:  27 capsule   Refills:  0            Where to get your medicines      These medications were sent to [facility] [address] [place], [place] - [address] AT [facility] [address] [facility] [address]    Hours:  24-hours Phone:  [phone]     gabapentin 300 MG capsule                Primary Care Provider Office Phone # Fax #    Jesus Macias [phone] [address] [facility] [address]        Equal Access to Services     [facility] HospitalKRISTINE : Hadii aad ku hadasho Soomaali, waaxda luqadaha, qaybta kaalmada adeegyada, waxay idiin hayaan adeeg kharash la'aan ah. So [facility] [phone].    ATENCIÓN: Si habla español, tiene a allen disposición servicios gratuitos de asistencia lingüística. Llame al [phone].    We comply with applicable federal civil rights laws and [place] laws. We do not discriminate on the basis of race, color, national origin, age, disability, sex, sexual orientation, or gender identity.            Thank you!     Thank you for choosing [facility]  for your care. Our goal is always to provide you with excellent care. Hearing back from our patients is one way we can continue to improve our services. Please take a few minutes to complete the written survey that you may receive in the mail after your visit with us. Thank you!             Your Updated Medication List - Protect others around you: Learn how to safely use, store and throw away your medicines at www.disposemymeds.org.          This list is accurate as of: 12/9/17  6:14 PM.  Always use your most recent med list.                   Brand Name Dispense Instructions for use Diagnosis    albuterol 108 (90 BASE) MCG/ACT Inhaler    PROAIR HFA/PROVENTIL HFA/VENTOLIN HFA    1 Inhaler    Inhale 2 puffs into the lungs every 6 hours as needed for shortness of breath / dyspnea or wheezing    Mild persistent asthma without complication       citalopram 40 MG tablet    celeXA    90 tablet    Take 1 tablet (40 mg) by mouth daily    Generalized anxiety disorder       cromolyn 4 % ophthalmic solution    OPTICROM    1 Bottle    Place 1 drop into  both eyes 4 times daily    Chronic allergic conjunctivitis       fluticasone 50 MCG/ACT spray    FLONASE    1 Bottle    Spray 1-2 sprays into both nostrils daily    Seasonal allergic rhinitis, unspecified allergic rhinitis trigger       gabapentin 300 MG capsule    NEURONTIN    27 capsule    TAKE 3 CAPSULES(900 MG) BY MOUTH THREE TIMES DAILY    Bilateral low back pain without sciatica, unspecified chronicity, Generalized anxiety disorder       ketotifen 0.025 % Soln ophthalmic solution    ZADITOR    1 Bottle    Place 1 drop into both eyes every 12 hours    Chronic allergic conjunctivitis       mometasone-formoterol 200-5 MCG/ACT oral inhaler    DULERA    1 Inhaler    Inhale 2 puffs into the lungs 2 times daily    Mild persistent asthma without complication       naproxen 500 MG tablet    NAPROSYN    30 tablet    Take 1 tablet (500 mg) by mouth 2 times daily as needed for moderate pain        triamcinolone 0.1 % lotion    KENALOG    60 mL    Apply sparingly to affected area two times daily as needed.    Atopic dermatitis, unspecified type

## 2017-12-10 NOTE — PATIENT INSTRUCTIONS
(M54.5) Bilateral low back pain without sciatica, unspecified chronicity  Comment:   Plan: gabapentin (NEURONTIN) 300 MG capsule            (F41.1) Generalized anxiety disorder  Comment:   Plan: gabapentin (NEURONTIN) 300 MG capsule            Keep follow up with Dr. Macias for 12/12/17.

## 2017-12-10 NOTE — NURSING NOTE
"Chief Complaint   Patient presents with     Refill Request     Pt is scheduled to see  on Tuesday 12/12/17. Pt requesting enough gabapentin until that visit.     Urgent Care       Initial /60  Pulse 70  Temp 98  F (36.7  C)  Resp 16  Wt 135 lb (61.2 kg)  BMI 21.79 kg/m2 Estimated body mass index is 21.79 kg/(m^2) as calculated from the following:    Height as of 9/12/17: 5' 6\" (1.676 m).    Weight as of this encounter: 135 lb (61.2 kg).  Medication Reconciliation: complete    "

## 2017-12-12 ENCOUNTER — OFFICE VISIT (OUTPATIENT)
Dept: INTERNAL MEDICINE | Facility: CLINIC | Age: 32
End: 2017-12-12
Payer: COMMERCIAL

## 2017-12-12 VITALS
TEMPERATURE: 98.1 F | RESPIRATION RATE: 16 BRPM | BODY MASS INDEX: 22.27 KG/M2 | WEIGHT: 138 LBS | HEART RATE: 60 BPM | SYSTOLIC BLOOD PRESSURE: 112 MMHG | DIASTOLIC BLOOD PRESSURE: 68 MMHG | OXYGEN SATURATION: 98 %

## 2017-12-12 DIAGNOSIS — F15.11 METHAMPHETAMINE ABUSE IN REMISSION (H): ICD-10-CM

## 2017-12-12 DIAGNOSIS — M54.50 BILATERAL LOW BACK PAIN WITHOUT SCIATICA, UNSPECIFIED CHRONICITY: ICD-10-CM

## 2017-12-12 DIAGNOSIS — F41.1 GENERALIZED ANXIETY DISORDER: Primary | ICD-10-CM

## 2017-12-12 DIAGNOSIS — Z23 NEED FOR PROPHYLACTIC VACCINATION AND INOCULATION AGAINST INFLUENZA: ICD-10-CM

## 2017-12-12 DIAGNOSIS — H10.45 CHRONIC ALLERGIC CONJUNCTIVITIS: ICD-10-CM

## 2017-12-12 DIAGNOSIS — J45.30 MILD PERSISTENT ASTHMA WITHOUT COMPLICATION: ICD-10-CM

## 2017-12-12 PROCEDURE — 90472 IMMUNIZATION ADMIN EACH ADD: CPT | Performed by: INTERNAL MEDICINE

## 2017-12-12 PROCEDURE — 90471 IMMUNIZATION ADMIN: CPT | Performed by: INTERNAL MEDICINE

## 2017-12-12 PROCEDURE — 99214 OFFICE O/P EST MOD 30 MIN: CPT | Mod: 25 | Performed by: INTERNAL MEDICINE

## 2017-12-12 PROCEDURE — 90670 PCV13 VACCINE IM: CPT | Performed by: INTERNAL MEDICINE

## 2017-12-12 PROCEDURE — 90686 IIV4 VACC NO PRSV 0.5 ML IM: CPT | Performed by: INTERNAL MEDICINE

## 2017-12-12 RX ORDER — GABAPENTIN 300 MG/1
CAPSULE ORAL
Qty: 270 CAPSULE | Refills: 5 | Status: SHIPPED | OUTPATIENT
Start: 2017-12-12 | End: 2018-04-11

## 2017-12-12 RX ORDER — CITALOPRAM HYDROBROMIDE 40 MG/1
40 TABLET ORAL DAILY
Qty: 90 TABLET | Refills: 1 | Status: SHIPPED | OUTPATIENT
Start: 2017-12-12 | End: 2018-04-11

## 2017-12-12 NOTE — PROGRESS NOTES
SUBJECTIVE:   Rachana eSnior is a 32 year old female who presents to clinic today for the following health issues:      Refills of medications, states gabapentin and citalopram have improved symptoms of back pain and anxiety    Anxiety Follow-Up    Status since last visit: Improved w/higher dose of citalopram    Other associated symptoms:None    Complicating factors:   Significant life event: No   Current substance abuse: Amphetamines in the past  Depression symptoms: No  JESSICA-7 SCORE 7/28/2016 4/20/2017   Total Score 20 13       GAD7    Asthma Follow-Up  - not using the controller inhaler on a regular basis  continues to smoke  Was ACT completed today?    Yes    ACT Total Scores 12/12/2017   ACT TOTAL SCORE (Goal Greater than or Equal to 20) 17   In the past 12 months, how many times did you visit the emergency room for your asthma without being admitted to the hospital? 0   In the past 12 months, how many times were you hospitalized overnight because of your asthma? 0       Recent asthma triggers that patient is dealing with: None    Problem list and histories reviewed & adjusted, as indicated.  Additional history: as documented    Labs reviewed in EPIC    Reviewed and updated as needed this visit by clinical staffTobacco       Reviewed and updated as needed this visit by Provider         ROS:  Constitutional, HEENT, cardiovascular, pulmonary, gi and gu systems are negative, except as otherwise noted.      OBJECTIVE:                                                    /68  Pulse 60  Temp 98.1  F (36.7  C) (Oral)  Resp 16  Wt 138 lb (62.6 kg)  SpO2 98%  BMI 22.27 kg/m2  Body mass index is 22.27 kg/(m^2).  GENERAL APPEARANCE: alert and no distress  NECK: no adenopathy, no asymmetry, masses, or scars and thyroid normal to palpation  RESP: lungs clear to auscultation - no rales, rhonchi or wheezes  CV: regular rates and rhythm, normal S1 S2, no S3 or S4 and no murmur, click or rub  Derm: dry, lichenified  sin under eyes and on upper eyelids.   Diagnostic test results:  none        ASSESSMENT/PLAN:                                                    1. Generalized anxiety disorder  Seems better controlled  con't SSRI and gabapentin  - gabapentin (NEURONTIN) 300 MG capsule; TAKE 3 CAPSULES(900 MG) BY MOUTH THREE TIMES DAILY  Dispense: 270 capsule; Refill: 5  - citalopram (CELEXA) 40 MG tablet; Take 1 tablet (40 mg) by mouth daily  Dispense: 90 tablet; Refill: 1    2. Bilateral low back pain without sciatica, unspecified chronicity  - gabapentin (NEURONTIN) 300 MG capsule; TAKE 3 CAPSULES(900 MG) BY MOUTH THREE TIMES DAILY  Dispense: 270 capsule; Refill: 5  - present dose of gabapentin was started when pt was in rx for meth abuse- she wa on this for anxiety previously and her inpt psychiatrist? Increased this from priro 100 tid ? Dose to 300 qid while at [facility] rehab in 2016.    3. Mild persistent asthma without complication  - mometasone-formoterol (DULERA) 200-5 MCG/ACT oral inhaler; Inhale 2 puffs into the lungs 2 times daily  Dispense: 1 Inhaler; Refill: 5    4. Chronic allergic conjunctivitis  - ketotifen (ZADITOR) 0.025 % SOLN ophthalmic solution; Place 1 drop into both eyes every 12 hours  Dispense: 1 Bottle; Refill: 11    5. Need for prophylactic vaccination and inoculation against influenza  - FLU VAC, SPLIT VIRUS IM > 3 YO (QUADRIVALENT) [31884]  - Vaccine Administration, Initial [23507]      Follow up with Provider -  6mo      Jesus Macias MD  [facility]    Injectable Influenza Immunization Documentation    1.  Is the person to be vaccinated sick today?   No    2. Does the person to be vaccinated have an allergy to a component   of the vaccine?   No  Egg Allergy Algorithm Link    3. Has the person to be vaccinated ever had a serious reaction   to influenza vaccine in the past?   No    4. Has the person to be vaccinated ever had Guillain-Barré syndrome?   No    Form completed  by Jennifer Lopez MA

## 2017-12-12 NOTE — NURSING NOTE
"Chief Complaint   Patient presents with     Refill Request     Anxiety     Back Pain       Initial /68  Pulse 60  Temp 98.1  F (36.7  C) (Oral)  Resp 16  Wt 138 lb (62.6 kg)  SpO2 98%  BMI 22.27 kg/m2 Estimated body mass index is 22.27 kg/(m^2) as calculated from the following:    Height as of 9/12/17: 5' 6\" (1.676 m).    Weight as of this encounter: 138 lb (62.6 kg).  Medication Reconciliation: complete   Jennifer Lopez MA      "

## 2017-12-12 NOTE — MR AVS SNAPSHOT
"              After Visit Summary   12/12/2017    Rachana Senior    MRN: [id number]           Patient Information     Date Of Birth          1985        Visit Information        Provider Department      12/12/2017 8:20 AM Jesus Macias MD [facility]        Today's Diagnoses     Generalized anxiety disorder    -  1    Bilateral low back pain without sciatica, unspecified chronicity        Mild persistent asthma without complication        Chronic allergic conjunctivitis          Care Instructions    Your provider has referred you to: [facility]: [facility] - [place] [phone] [email or web address]    Your provider has referred you to: [facility]: [employer] - [place]ina [place] [phone]   [email or web address]  [place] [phone]   [email or web address]          Follow-ups after your visit        Who to contact     If you have questions or need follow up information about today's clinic visit or your schedule please contact [facility] directly at [phone].  Normal or non-critical lab and imaging results will be communicated to you by MyChart, letter or phone within 4 business days after the clinic has received the results. If you do not hear from us within 7 days, please contact the clinic through MyChart or phone. If you have a critical or abnormal lab result, we will notify you by phone as soon as possible.  Submit refill requests through [employer] or call your pharmacy and they will forward the refill request to us. Please allow 3 business days for your refill to be completed.          Additional Information About Your Visit        MyChart Information     [employer] lets you send messages to your doctor, view your test results, renew your prescriptions, schedule appointments and more. To sign up, go to [email or web address] . Click on \"Log in\" on the left side of the screen, which will take you to " "the Welcome page. Then click on \"Sign up Now\" on the right side of the page.     You will be asked to enter the access code listed below, as well as some personal information. Please follow the directions to create your username and password.     Your access code is: [email or web address]  Expires: 3/12/2018  9:07 AM     Your access code will  in 90 days. If you need help or a new code, please call your [place] clinic or [phone].        Care EveryWhere ID     This is your Care EveryWhere ID. This could be used by other organizations to access your [place] medical records  [id number]        Your Vitals Were     Pulse Temperature Respirations Pulse Oximetry BMI (Body Mass Index)       60 98.1  F (36.7  C) (Oral) 16 98% 22.27 kg/m2        Blood Pressure from Last 3 Encounters:   17 112/68   17 100/60   17 120/70    Weight from Last 3 Encounters:   17 138 lb (62.6 kg)   17 135 lb (61.2 kg)   17 127 lb (57.6 kg)              Today, you had the following     No orders found for display         Today's Medication Changes          These changes are accurate as of: 17  9:07 AM.  If you have any questions, ask your nurse or doctor.               Stop taking these medicines if you haven't already. Please contact your care team if you have questions.     naproxen 500 MG tablet   Commonly known as:  NAPROSYN   Stopped by:  Jesus Macias MD                Where to get your medicines      These medications were sent to [facility] [address] [place], [place] - [address] AT [facility] [address]  [address], [facility] [address]     Phone:  [phone]     citalopram 40 MG tablet    gabapentin 300 MG capsule    ketotifen 0.025 % Soln ophthalmic solution    mometasone-formoterol 200-5 MCG/ACT oral inhaler                Primary Care Provider Office Phone # Fax #    Jesus Macias [phone]       [address] [facility] [address]        Equal Access to Services     CHRISTIANO GARCIA : Hadii silverio menendez obialeks Cristhianali, warussda jtrosyha, qaaleydata georgeshermannjas osorio, evita sidhuadrianabenjamin duenas. So [facility] [phone].    ATENCIÓN: Si habla español, tiene a allen disposición servicios gratuitos de asistencia lingüística. Llame al [phone].    We comply with applicable federal civil rights laws and [place] laws. We do not discriminate on the basis of race, color, national origin, age, disability, sex, sexual orientation, or gender identity.            Thank you!     Thank you for choosing [facility]  for your care. Our goal is always to provide you with excellent care. Hearing back from our patients is one way we can continue to improve our services. Please take a few minutes to complete the written survey that you may receive in the mail after your visit with us. Thank you!             Your Updated Medication List - Protect others around you: Learn how to safely use, store and throw away your medicines at www.disposemymeds.org.          This list is accurate as of: 12/12/17  9:07 AM.  Always use your most recent med list.                   Brand Name Dispense Instructions for use Diagnosis    albuterol 108 (90 BASE) MCG/ACT Inhaler    PROAIR HFA/PROVENTIL HFA/VENTOLIN HFA    1 Inhaler    Inhale 2 puffs into the lungs every 6 hours as needed for shortness of breath / dyspnea or wheezing    Mild persistent asthma without complication       citalopram 40 MG tablet    celeXA    90 tablet    Take 1 tablet (40 mg) by mouth daily    Generalized anxiety disorder       cromolyn 4 % ophthalmic solution    OPTICROM    1 Bottle    Place 1 drop into both eyes 4 times daily    Chronic allergic conjunctivitis       fluticasone 50 MCG/ACT spray    FLONASE    1 Bottle    Spray 1-2 sprays into both nostrils daily    Seasonal allergic rhinitis, unspecified allergic rhinitis trigger       gabapentin 300 MG  capsule    NEURONTIN    270 capsule    TAKE 3 CAPSULES(900 MG) BY MOUTH THREE TIMES DAILY    Bilateral low back pain without sciatica, unspecified chronicity, Generalized anxiety disorder       ketotifen 0.025 % Soln ophthalmic solution    ZADITOR    1 Bottle    Place 1 drop into both eyes every 12 hours    Chronic allergic conjunctivitis       mometasone-formoterol 200-5 MCG/ACT oral inhaler    DULERA    1 Inhaler    Inhale 2 puffs into the lungs 2 times daily    Mild persistent asthma without complication       triamcinolone 0.1 % lotion    KENALOG    60 mL    Apply sparingly to affected area two times daily as needed.    Atopic dermatitis, unspecified type

## 2017-12-12 NOTE — PATIENT INSTRUCTIONS
Your provider has referred you to: N: [facility] - [place] [phone] [email or web address]    Your provider has referred you to: [facility]: [facility] - Dina [place] [phone]   [email or web address]  [place] [phone]   [email or web address]

## 2017-12-13 ASSESSMENT — ASTHMA QUESTIONNAIRES: ACT_TOTALSCORE: 17

## 2018-04-11 DIAGNOSIS — F41.1 GENERALIZED ANXIETY DISORDER: ICD-10-CM

## 2018-04-11 DIAGNOSIS — M54.50 BILATERAL LOW BACK PAIN WITHOUT SCIATICA, UNSPECIFIED CHRONICITY: ICD-10-CM

## 2018-04-11 DIAGNOSIS — J30.2 SEASONAL ALLERGIC RHINITIS: ICD-10-CM

## 2018-04-11 RX ORDER — FLUTICASONE PROPIONATE 50 MCG
SPRAY, SUSPENSION (ML) NASAL
Qty: 48 ML | Refills: 2 | Status: SHIPPED | OUTPATIENT
Start: 2018-04-11 | End: 2019-06-18

## 2018-04-11 RX ORDER — CITALOPRAM HYDROBROMIDE 40 MG/1
TABLET ORAL
Qty: 90 TABLET | Refills: 2 | Status: SHIPPED | OUTPATIENT
Start: 2018-04-11 | End: 2019-01-11

## 2018-04-11 NOTE — TELEPHONE ENCOUNTER
"Requested Prescriptions   Pending Prescriptions Disp Refills     gabapentin (NEURONTIN) 300 MG capsule [Pharmacy Med Name: GABAPENTIN 300MG CAPSULES] 270 capsule 0      Last Written Prescription Date:  12/12/17  Last Fill Quantity: 270,  # refills: 5   Last office visit: 12/12/2017 with prescribing provider:  12/12/17   Future Office Visit:  0 Sig: TAKE 3 CAPSULES(900 MG) BY MOUTH THREE TIMES DAILY    There is no refill protocol information for this order        citalopram (CELEXA) 40 MG tablet [Pharmacy Med Name: CITALOPRAM 40MG TABLETS] 90 tablet 0    Last Written Prescription Date:  12/12/17  Last Fill Quantity: 90,  # refills: 1   Last office visit: 12/12/2017 with prescribing provider:  12/12/17   Future Office Visit:  0    PHQ-9 SCORE 10/11/2016 4/20/2017 9/12/2017   Total Score 16 5 1    Sig: TAKE 1 TABLET(40 MG) BY MOUTH DAILY    SSRIs Protocol Passed    4/11/2018  9:04 AM       Passed - Recent (12 mo) or future (30 days) visit within the authorizing provider's specialty    Patient had office visit in the last 12 months or has a visit in the next 30 days with authorizing provider or within the authorizing provider's specialty.  See \"Patient Info\" tab in inbasket, or \"Choose Columns\" in Meds & Orders section of the refill encounter.           Passed - Patient is age 18 or older       Passed - No active pregnancy on record       Passed - No positive pregnancy test in last 12 months        fluticasone (FLONASE) 50 MCG/ACT spray [Pharmacy Med Name: FLUTICASONE 50MCG NASAL SP (120) RX] 16 mL 0      Last Written Prescription Date:  04/20/17  Last Fill Quantity: 1,  # refills: 11   Last office visit: 12/12/2017 with prescribing provider:  12/12/17   Future Office Visit:  0 Sig: SHAKE LIQUID AND USE 1 TO 2 SPRAYS IN EACH NOSTRIL DAILY    Inhaled Steroids Protocol Failed    4/11/2018  9:04 AM       Failed - Asthma control assessment score within normal limits in last 6 months    Please review ACT score.          " "Passed - Patient is age 12 or older       Passed - Recent (6 mo) or future (30 days) visit within the authorizing provider's specialty    Patient had office visit in the last 6 months or has a visit in the next 30 days with authorizing provider or within the authorizing provider's specialty.  See \"Patient Info\" tab in inbasket, or \"Choose Columns\" in Meds & Orders section of the refill encounter.            "

## 2018-04-17 RX ORDER — GABAPENTIN 300 MG/1
CAPSULE ORAL
Qty: 270 CAPSULE | Refills: 2 | Status: SHIPPED | OUTPATIENT
Start: 2018-04-17 | End: 2019-01-11

## 2018-05-08 DIAGNOSIS — F41.1 GENERALIZED ANXIETY DISORDER: ICD-10-CM

## 2018-05-08 DIAGNOSIS — M54.50 BILATERAL LOW BACK PAIN WITHOUT SCIATICA, UNSPECIFIED CHRONICITY: ICD-10-CM

## 2018-05-08 RX ORDER — GABAPENTIN 300 MG/1
CAPSULE ORAL
Qty: 27 CAPSULE | Refills: 0 | OUTPATIENT
Start: 2018-05-08

## 2018-05-13 DIAGNOSIS — M54.50 BILATERAL LOW BACK PAIN WITHOUT SCIATICA, UNSPECIFIED CHRONICITY: ICD-10-CM

## 2018-05-13 DIAGNOSIS — F41.1 GENERALIZED ANXIETY DISORDER: ICD-10-CM

## 2018-05-14 RX ORDER — GABAPENTIN 300 MG/1
CAPSULE ORAL
Qty: 270 CAPSULE | Refills: 0 | OUTPATIENT
Start: 2018-05-14

## 2018-05-14 NOTE — TELEPHONE ENCOUNTER
Patient already has refills on file.  Pharmacy informed and understood.  Please disregard encounter.

## 2018-07-07 ENCOUNTER — HEALTH MAINTENANCE LETTER (OUTPATIENT)
Age: 33
End: 2018-07-07

## 2018-11-14 DIAGNOSIS — H10.45 CHRONIC ALLERGIC CONJUNCTIVITIS: ICD-10-CM

## 2018-11-15 RX ORDER — CROMOLYN SODIUM 40 MG/ML
SOLUTION/ DROPS OPHTHALMIC
Qty: 10 ML | Refills: 0 | Status: SHIPPED | OUTPATIENT
Start: 2018-11-15 | End: 2019-07-12

## 2018-11-15 NOTE — TELEPHONE ENCOUNTER
Requested Prescriptions   Pending Prescriptions Disp Refills     cromolyn (OPTICROM) 4 % ophthalmic solution [Pharmacy Med Name: CROMOLYN SODIUM 4% OPHTH SOLN 10ML]  Last Written Prescription Date:  09/12/2017  Last Fill Quantity: 1 bottle,  # refills: 11   Last Office Visit: 12/12/2017   Future Office Visit:      10 mL 0     Sig: INSTILL 1 DROP IN BOTH EYES FOUR TIMES DAILY    There is no refill protocol information for this order

## 2019-01-07 DIAGNOSIS — H10.45 CHRONIC ALLERGIC CONJUNCTIVITIS: ICD-10-CM

## 2019-01-07 NOTE — TELEPHONE ENCOUNTER
"Requested Prescriptions   Pending Prescriptions Disp Refills     ketotifen (ZADITOR/REFRESH ANTI-ITCH) 0.025 % ophthalmic solution [Pharmacy Med Name: KETOTIFEN 0.025% OPTH SOLUTION 5ML]  Last Written Prescription Date:  12/12/2017  Last Fill Quantity: 1,  # refills: 11   Last Office Visit: 12/12/2017   Future Office Visit:      5 mL 0     Sig: INSTILL 1 DROP IN BOTH EYES EVERY 12 HOURS    Miscellaneous Opthalmic Allergy Drops Protocol Failed - 1/7/2019  7:45 AM       Failed - Recent (12 mo) or future (30 days) visit within the authorizing provider's specialty    Patient had office visit in the last 12 months or has a visit in the next 30 days with authorizing provider or within the authorizing provider's specialty.  See \"Patient Info\" tab in inbasket, or \"Choose Columns\" in Meds & Orders section of the refill encounter.             Passed - Patient is age 4 or older       Passed - Medication is active on med list       Passed - Patient is not pregnant       Passed - No positive pregnancy test on record in past 12 mos          "

## 2019-01-11 ENCOUNTER — OFFICE VISIT (OUTPATIENT)
Dept: INTERNAL MEDICINE | Facility: CLINIC | Age: 34
End: 2019-01-11

## 2019-01-11 VITALS
DIASTOLIC BLOOD PRESSURE: 70 MMHG | HEART RATE: 80 BPM | SYSTOLIC BLOOD PRESSURE: 120 MMHG | TEMPERATURE: 98.1 F | BODY MASS INDEX: 20.34 KG/M2 | WEIGHT: 126 LBS

## 2019-01-11 DIAGNOSIS — M54.50 BILATERAL LOW BACK PAIN WITHOUT SCIATICA, UNSPECIFIED CHRONICITY: Primary | ICD-10-CM

## 2019-01-11 DIAGNOSIS — F41.1 GENERALIZED ANXIETY DISORDER: ICD-10-CM

## 2019-01-11 DIAGNOSIS — H10.45 CHRONIC ALLERGIC CONJUNCTIVITIS: ICD-10-CM

## 2019-01-11 DIAGNOSIS — L20.9 ATOPIC DERMATITIS, UNSPECIFIED TYPE: ICD-10-CM

## 2019-01-11 DIAGNOSIS — F15.21: ICD-10-CM

## 2019-01-11 PROCEDURE — 99214 OFFICE O/P EST MOD 30 MIN: CPT | Performed by: INTERNAL MEDICINE

## 2019-01-11 RX ORDER — GABAPENTIN 300 MG/1
600 CAPSULE ORAL 2 TIMES DAILY
Qty: 120 CAPSULE | Refills: 1 | Status: SHIPPED | OUTPATIENT
Start: 2019-01-11 | End: 2019-03-06

## 2019-01-11 RX ORDER — CITALOPRAM HYDROBROMIDE 40 MG/1
40 TABLET ORAL DAILY
Qty: 90 TABLET | Refills: 1 | Status: SHIPPED | OUTPATIENT
Start: 2019-01-11 | End: 2019-10-22

## 2019-01-11 ASSESSMENT — ANXIETY QUESTIONNAIRES
2. NOT BEING ABLE TO STOP OR CONTROL WORRYING: NOT AT ALL
3. WORRYING TOO MUCH ABOUT DIFFERENT THINGS: NOT AT ALL
7. FEELING AFRAID AS IF SOMETHING AWFUL MIGHT HAPPEN: NOT AT ALL
GAD7 TOTAL SCORE: 5
1. FEELING NERVOUS, ANXIOUS, OR ON EDGE: MORE THAN HALF THE DAYS
5. BEING SO RESTLESS THAT IT IS HARD TO SIT STILL: SEVERAL DAYS
6. BECOMING EASILY ANNOYED OR IRRITABLE: MORE THAN HALF THE DAYS
IF YOU CHECKED OFF ANY PROBLEMS ON THIS QUESTIONNAIRE, HOW DIFFICULT HAVE THESE PROBLEMS MADE IT FOR YOU TO DO YOUR WORK, TAKE CARE OF THINGS AT HOME, OR GET ALONG WITH OTHER PEOPLE: NOT DIFFICULT AT ALL

## 2019-01-11 ASSESSMENT — PATIENT HEALTH QUESTIONNAIRE - PHQ9
5. POOR APPETITE OR OVEREATING: NOT AT ALL
SUM OF ALL RESPONSES TO PHQ QUESTIONS 1-9: 4

## 2019-01-11 NOTE — PROGRESS NOTES
SUBJECTIVE:   Rachana Senior is a 33 year old female who presents to clinic today for the following health issues:    Patient is long-standing generalized anxiety disorder as well as chronic lower back pain.  She also was a methamphetamine addict for quite a while.  She started coming here lower a year ago at which time she presented having taken gabapentin 900 mg 3 times daily for her pain and anxiety.  Since seeing her first time we started  an SSRI which is helped with her anxiety.  She has been able to reduce her gabapentin somewhat to 600 twice a day though this is often done more due to a lack of supply than anything.  She has had on and off insurance during the last year and has had to ration her supply of medication to a degree.    Anxiety Follow-Up    Status since last visit: No change    Other associated symptoms:None    Complicating factors:   Significant life event: No   Current substance abuse: None  Depression symptoms: No  JESSICA-7 SCORE 7/28/2016 4/20/2017 1/11/2019   Total Score 20 13 5     JESSICA-7    Amount of exercise or physical activity: None    Problems taking medications regularly: No    Medication side effects: none    Diet: regular (no restrictions)        Asthma Follow-Up  -She not been using her controller inhaler more so because she does not feel like she needs it.  Was ACT completed today?    Yes    ACT Total Scores 1/11/2019   ACT TOTAL SCORE (Goal Greater than or Equal to 20) 19   In the past 12 months, how many times did you visit the emergency room for your asthma without being admitted to the hospital? 0   In the past 12 months, how many times were you hospitalized overnight because of your asthma? 0       Recent asthma triggers that patient is dealing with: None      Allergic conjunctivitis  Periorbital dermatitis  -Significant tearing watery eyes itching.  She has not been able to take her eyedrops due to insurance coverage.  She also has not been using any topical medication for  the skin or has she seen dermatology.    Problem list and histories reviewed & adjusted, as indicated.  Additional history: as documented    Reviewed and updated as needed this visit by clinical staff  Allergies  Meds       Reviewed and updated as needed this visit by Provider         ROS:  Constitutional, HEENT, cardiovascular, pulmonary, gi and gu systems are negative, except as otherwise noted.    OBJECTIVE:                                                    /70   Pulse 80   Temp 98.1  F (36.7  C) (Oral)   Wt 57.2 kg (126 lb)   LMP 01/10/2019   BMI 20.34 kg/m    Body mass index is 20.34 kg/m .  GENERAL APPEARANCE: alert and no distress  EYES: Both eyes are injected, watery chronically irritated appearing.  The mukund-orbital tissue appears dry irritated  HENT: NCAT. mouth without ulcers or lesions  NECK: no adenopathy, no asymmetry, masses, or scars and thyroid normal to palpation  RESP: lungs clear to auscultation - no rales, rhonchi or wheezes    Diagnostic test results:  none      ASSESSMENT/PLAN:                                                    1. Bilateral low back pain without sciatica, unspecified chronicity  We will lower her dose to 600 twice daily.  The Goal of ours is going to be to a gradual reduction in her overall gabapentin dose.  - gabapentin (NEURONTIN) 300 MG capsule; Take 2 capsules (600 mg) by mouth 2 times daily  Dispense: 120 capsule; Refill: 1    2. Atopic dermatitis, unspecified type  Without insurance I do not think she will be able to afford topical tacrolimus.  - DERMATOLOGY REFERRAL    3. Chronic allergic conjunctivitis  See ophthalmology continue the drops  - OPHTHALMOLOGY ADULT REFERRAL    4. Generalized anxiety disorder  Continue SSRI and adjust gabapentin dose as detailed above  - gabapentin (NEURONTIN) 300 MG capsule; Take 2 capsules (600 mg) by mouth 2 times daily  Dispense: 120 capsule; Refill: 1  - citalopram (CELEXA) 40 MG tablet; Take 1 tablet (40 mg) by mouth  daily  Dispense: 90 tablet; Refill: 1    5. 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  Asthma  Continue meds      Jesus Macias MD  [facility]

## 2019-01-12 ASSESSMENT — ASTHMA QUESTIONNAIRES: ACT_TOTALSCORE: 19

## 2019-01-12 ASSESSMENT — ANXIETY QUESTIONNAIRES: GAD7 TOTAL SCORE: 5

## 2019-02-14 NOTE — PROGRESS NOTES
SUBJECTIVE:  Chief Complaint   Patient presents with     Refill Request     Pt is scheduled to see  on Tuesday 12/12/17. Pt requesting enough gabapentin until that visit.     Urgent Care     Rachana Senior is a 32 year old female presents with a chief complaint of:  1) refill of her gabapentin, takes 900mg TID.    Has an appointment with her PCP in a few days.  12/12/17.       Past Medical History:   Diagnosis Date     Depressive disorder, not elsewhere classified 8/9/2006     GENERALIZED ANXIETY DIS 10/31/2006     Insufficient prenatal care 6/20/2006     Mild hyperemesis gravidarum, antepartum 6/27/2006     Seizures (H)      Tobacco use disorder 8/9/2006     Unspecified asthma(493.90)     Asthma     Patient Active Problem List   Diagnosis     Lumbosacral ligament sprain     Tobacco use disorder     GENERALIZED ANXIETY DIS     Atopic dermatitis, unspecified type     Bilateral low back pain without sciatica, unspecified chronicity     Seasonal allergic rhinitis, unspecified allergic rhinitis trigger     Mild persistent asthma without complication     Chronic allergic rhinitis due to other allergic trigger, unspecified seasonality     Chronic allergic conjunctivitis     Social History   Substance Use Topics     Smoking status: Current Every Day Smoker     Packs/day: 1.00     Years: 10.00     Types: Cigarettes     Smokeless tobacco: Never Used      Comment: 1 PPD     Alcohol use No       ROS:  CONSTITUTIONAL:NEGATIVE for fever, chills, change in weight  INTEGUMENTARY/SKIN: NEGATIVE for worrisome rashes, moles or lesions  MUSCULOSKELETAL: as per HPI    EXAM:   /60  Pulse 70  Temp 98  F (36.7  C)  Resp 16  Wt 135 lb (61.2 kg)  BMI 21.79 kg/m2  Gen: healthy,alert,no distress    (M54.5) Bilateral low back pain without sciatica, unspecified chronicity  Comment:   Plan: gabapentin (NEURONTIN) 300 MG capsule            (F41.1) Generalized anxiety disorder  Comment:   Plan: gabapentin (NEURONTIN) 300 MG  capsule            Keep follow up with Dr. Macias for 12/12/17.         Statement Selected

## 2019-03-06 DIAGNOSIS — M54.50 BILATERAL LOW BACK PAIN WITHOUT SCIATICA, UNSPECIFIED CHRONICITY: ICD-10-CM

## 2019-03-06 DIAGNOSIS — F41.1 GENERALIZED ANXIETY DISORDER: ICD-10-CM

## 2019-03-06 RX ORDER — GABAPENTIN 300 MG/1
CAPSULE ORAL
Qty: 90 CAPSULE | Refills: 1 | Status: SHIPPED | OUTPATIENT
Start: 2019-03-06 | End: 2019-04-13

## 2019-03-06 NOTE — TELEPHONE ENCOUNTER
gabapentin      Last Written Prescription Date:  1/11/19  Last Fill Quantity: 120,   # refills: 1  Last Office Visit: 1/11/19  Future Office visit:       Routing refill request to provider for review/approval because:  Drug not on the [facility], P or [facility] refill protocol or controlled substance

## 2019-05-23 ENCOUNTER — TELEPHONE (OUTPATIENT)
Dept: INTERNAL MEDICINE | Facility: CLINIC | Age: 34
End: 2019-05-23

## 2019-05-23 DIAGNOSIS — H10.45 CHRONIC ALLERGIC CONJUNCTIVITIS: ICD-10-CM

## 2019-05-23 NOTE — TELEPHONE ENCOUNTER
Reason for Call:  Medication or medication refill:    Do you use a [facility]?  Name of the pharmacy and phone number for the current request:  [facility] tele # [phone]    Name of the medication requested: cromolyn sodium 4% ophth soln need alternative on backorder    Other request:     Can we leave a detailed message on this number?     Phone number patient can be reached at: Other phone number:  See above tele #    Best Time: soon    Call taken on 5/23/2019 at 9:03 AM by Heidi Calabrese

## 2019-06-18 DIAGNOSIS — J30.2 SEASONAL ALLERGIC RHINITIS: ICD-10-CM

## 2019-06-18 RX ORDER — FLUTICASONE PROPIONATE 50 MCG
1-2 SPRAY, SUSPENSION (ML) NASAL DAILY
Qty: 48 ML | Refills: 1 | Status: SHIPPED | OUTPATIENT
Start: 2019-06-18

## 2019-06-18 NOTE — TELEPHONE ENCOUNTER
Requested Prescriptions   Pending Prescriptions Disp Refills     fluticasone (FLONASE) 50 MCG/ACT nasal spray 48 mL 2     Sig: Spray 1-2 sprays into both nostrils daily       There is no refill protocol information for this order      Last Written Prescription Date:  04/11/18  Last Fill Quantity: 48mL,  # refills: 2   Last office visit: 1/11/2019 with prescribing provider:  01/11/19   Future Office Visit:  0  ACT Total Scores 9/12/2017 12/12/2017 1/11/2019   ACT TOTAL SCORE (Goal Greater than or Equal to 20) 19 17 19   In the past 12 months, how many times did you visit the emergency room for your asthma without being admitted to the hospital? 0 0 0   In the past 12 months, how many times were you hospitalized overnight because of your asthma? 0 0 0

## 2019-06-20 DIAGNOSIS — F41.1 GENERALIZED ANXIETY DISORDER: ICD-10-CM

## 2019-06-20 DIAGNOSIS — M54.50 BILATERAL LOW BACK PAIN WITHOUT SCIATICA, UNSPECIFIED CHRONICITY: ICD-10-CM

## 2019-06-21 RX ORDER — GABAPENTIN 300 MG/1
CAPSULE ORAL
Qty: 104 CAPSULE | Refills: 0 | OUTPATIENT
Start: 2019-06-21

## 2019-06-21 NOTE — TELEPHONE ENCOUNTER
Rx sent back to pharmacy. Request too early    [facility] profile:  [email or web address]    Last Filled:  6/1/2019, #104- request considered early  56/12/2019, #60  4/15/2019, #90    Faiza ULRICH RN, BSN, PHN

## 2019-06-21 NOTE — TELEPHONE ENCOUNTER
Requested Prescriptions   Pending Prescriptions Disp Refills     gabapentin (NEURONTIN) 300 MG capsule [Pharmacy Med Name: GABAPENTIN 300MG CAPSULES] 104 capsule 0     Sig: TAKE 2 CAPSULES BY MOUTH TWICE DAILY FOR 14 DAYS. THEN 1 CAPSULE EVERY MORNING AND 2 CAPSULES EVERY EVENING FOR 16 DAYS       There is no refill protocol information for this order        Last Written Prescription Date:  5/14/2019  Last Fill Quantity: 104,  # refills: 0   Last Office Visit: 1/11/2019   Future Office Visit:

## 2019-07-12 DIAGNOSIS — H10.45 CHRONIC ALLERGIC CONJUNCTIVITIS: ICD-10-CM

## 2019-07-12 NOTE — TELEPHONE ENCOUNTER
Requested Prescriptions   Pending Prescriptions Disp Refills     cromolyn (OPTICROM) 4 % ophthalmic solution [Pharmacy Med Name: CROMOLYN SODIUM 4% OPHTH SOLN 10ML] 10 mL 0     Sig: INSTILL 1 DROP IN BOTH EYES FOUR TIMES DAILY       There is no refill protocol information for this order        Last Written Prescription Date:  11/15/2018  Last Fill Quantity: 10mL,  # refills: 0   Last Office Visit: 1/11/2019   Future Office Visit:

## 2019-07-15 ENCOUNTER — TELEPHONE (OUTPATIENT)
Dept: INTERNAL MEDICINE | Facility: CLINIC | Age: 34
End: 2019-07-15

## 2019-07-15 RX ORDER — CROMOLYN SODIUM 40 MG/ML
SOLUTION/ DROPS OPHTHALMIC
Qty: 10 ML | Refills: 0 | Status: SHIPPED | OUTPATIENT
Start: 2019-07-15 | End: 2019-09-15

## 2019-07-15 NOTE — LETTER
[facility]  [address] [address]  [phone]  July 15, 2019    Rachana Senior  [address] [facility][1] [address]              Dear Rachana,    We care about your health and based on a review of your medical records, recommend the the following, to better manage your health:      You are in particular need of attention regarding:  -Asthma  -Cervical Cancer Screening  -Wellness (Physical) Visit     I am recommending that you:     -schedule a WELLNESS (Physical) APPOINTMENT with me.   I will check fasting labs the same day - nothing to eat except water and meds for 8-10 hours prior.    -schedule a PAP SMEAR EXAM which is due.  Please disregard this reminder if you have had this exam elsewhere within the last year.  It would be helpful for us to have a copy of your recent pap smear report in our file so that we can best coordinate your care.    If you are under/uninsured, we recommend you contact the [facility]. They offer pap smears at no charge or on a sliding fee charge. You can schedule with them at [phone]. Please have them send us the results.    -Complete and return the attached ASTHMA CONTROL TEST.  If your total score is 19 or less or you have been to the ER or urgent care for your asthma, then please schedule an asthma followup appointment.    Please complete the enclosed PHQ9 and mail back to clinic in the envelope provided.         Here is a list of Health Maintenance topics that are due now or due soon:  Health Maintenance Due   Topic Date Due     Preventive Care Visit  1986     Asthma Action Plan - yearly  1986     PAP  05/01/2017     Asthma Control Test  07/11/2019     Depression Assessment  07/11/2019       Please call us at [phone] or [phone] (or use [employer]) to address the above recommendations.     Thank you for trusting [facility].  We appreciate the opportunity to serve you  and look forward to supporting your healthcare needs in the future.    If you have (or plan to have) any of these tests done at a facility other than a [facility] or a [facility], please have the results from these tests sent to your primary physician at [facility].    Healthy Regards,    Jesus Macias MD

## 2019-07-15 NOTE — TELEPHONE ENCOUNTER
Panel Management Review      Patient has the following on her problem list:     Asthma review     ACT Total Scores 1/11/2019   ACT TOTAL SCORE (Goal Greater than or Equal to 20) 19   In the past 12 months, how many times did you visit the emergency room for your asthma without being admitted to the hospital? 0   In the past 12 months, how many times were you hospitalized overnight because of your asthma? 0      1. Is Asthma diagnosis on the Problem List? Yes    2. Is Asthma listed on Health Maintenance? Yes    3. Patient is due for:  ACT and AAP      Composite cancer screening  Chart review shows that this patient is due/due soon for the following Pap Smear  Summary:    Patient is due/failing the following:   AAP, ACT, PAP, PHQ9 and PHYSICAL    Action needed:   Patient needs office visit for physical/pap., Patient needs to do ACT. and Patient needs to do PHQ9.    Type of outreach:    Phone, left message for patient to call back.  and Sent letter.    Questions for provider review:    None                                                                                                                                    Ruby Dawson, [facility]       Chart routed to none .

## 2019-07-15 NOTE — TELEPHONE ENCOUNTER
Routing refill request to provider for review/approval because:  Drug not on the [facility] refill protocol     Faiza ULRICH RN, BSN, PHN

## 2019-07-24 DIAGNOSIS — F41.1 GENERALIZED ANXIETY DISORDER: ICD-10-CM

## 2019-07-24 DIAGNOSIS — M54.50 BILATERAL LOW BACK PAIN WITHOUT SCIATICA, UNSPECIFIED CHRONICITY: ICD-10-CM

## 2019-07-24 NOTE — TELEPHONE ENCOUNTER
gabapentin      Last Written Prescription Date:  7/2/19  Last Fill Quantity: 60,   # refills: 0  Last Office Visit: 1/11/19  Future Office visit:       Routing refill request to provider for review/approval because:  Drug not on the [facility], P or [facility] refill protocol or controlled substance

## 2019-07-31 RX ORDER — GABAPENTIN 300 MG/1
CAPSULE ORAL
Qty: 60 CAPSULE | Refills: 0 | OUTPATIENT
Start: 2019-07-31

## 2019-07-31 RX ORDER — GABAPENTIN 100 MG/1
200 CAPSULE ORAL 2 TIMES DAILY
Qty: 120 CAPSULE | Refills: 0 | Status: SHIPPED | OUTPATIENT
Start: 2019-07-31 | End: 2019-08-27

## 2019-07-31 NOTE — TELEPHONE ENCOUNTER
Continue tapering schedule  This month 200 mg bid  Next month 100 mg bid  Then prn 100 bid - then off completely

## 2019-08-27 DIAGNOSIS — M54.50 BILATERAL LOW BACK PAIN WITHOUT SCIATICA, UNSPECIFIED CHRONICITY: ICD-10-CM

## 2019-08-27 DIAGNOSIS — F41.1 GENERALIZED ANXIETY DISORDER: ICD-10-CM

## 2019-08-30 RX ORDER — GABAPENTIN 100 MG/1
100 CAPSULE ORAL 2 TIMES DAILY
Qty: 60 CAPSULE | Refills: 0 | Status: SHIPPED | OUTPATIENT
Start: 2019-08-30 | End: 2019-09-15

## 2019-09-15 DIAGNOSIS — F41.1 GENERALIZED ANXIETY DISORDER: ICD-10-CM

## 2019-09-15 DIAGNOSIS — H10.45 CHRONIC ALLERGIC CONJUNCTIVITIS: ICD-10-CM

## 2019-09-15 DIAGNOSIS — M54.50 BILATERAL LOW BACK PAIN WITHOUT SCIATICA, UNSPECIFIED CHRONICITY: ICD-10-CM

## 2019-09-15 NOTE — TELEPHONE ENCOUNTER
Requested Prescriptions   Pending Prescriptions Disp Refills     gabapentin (NEURONTIN) 100 MG capsule [Pharmacy Med Name: GABAPENTIN 100MG CAPSULES] 60 capsule 0     Sig: TAKE 1 CAPSULE(100 MG) BY MOUTH TWICE DAILY   Last Written Prescription Date:  8/30/2019  Last Fill Quantity: 60,  # refills: 0   Last Office Visit: 1/11/2019   Future Office Visit:         There is no refill protocol information for this order        cromolyn (OPTICROM) 4 % ophthalmic solution [Pharmacy Med Name: CROMOLYN SODIUM 4% OPHTH SOLN 10ML] 10 mL 0     Sig: INSTILL 1 DROP IN BOTH EYES FOUR TIMES DAILY   Last Written Prescription Date:  7/15/2019  Last Fill Quantity: 10mL,  # refills: 0   Last Office Visit: 1/11/2019   Future Office Visit:         There is no refill protocol information for this order

## 2019-09-15 NOTE — LETTER
[facility]  [address] [id number] [phone]            Rachana Senior  [address] [facility] [address]        September 17, 2019    Dear Rachana,    While refilling your prescription today, we noticed that you are due for an appointment with your provider.  We will refill your prescription for 30 days, but a follow-up appointment must be made before any additional refills can be approved.     Taking care of your health is important to us and we look forward to seeing you in the near future.  Please call us at [phone] or [phone] (or use [employer]) to schedule an appointment.     Please disregard this notice if you have already made an appointment.    Sincerely,        [facility]

## 2019-09-16 RX ORDER — GABAPENTIN 100 MG/1
CAPSULE ORAL
Qty: 60 CAPSULE | Refills: 0 | Status: SHIPPED | OUTPATIENT
Start: 2019-09-16 | End: 2019-10-22

## 2019-09-16 RX ORDER — CROMOLYN SODIUM 40 MG/ML
SOLUTION/ DROPS OPHTHALMIC
Qty: 10 ML | Refills: 0 | Status: SHIPPED | OUTPATIENT
Start: 2019-09-16 | End: 2019-10-24

## 2019-10-22 ENCOUNTER — OFFICE VISIT (OUTPATIENT)
Dept: INTERNAL MEDICINE | Facility: CLINIC | Age: 34
End: 2019-10-22
Payer: COMMERCIAL

## 2019-10-22 ENCOUNTER — TELEPHONE (OUTPATIENT)
Dept: INTERNAL MEDICINE | Facility: CLINIC | Age: 34
End: 2019-10-22

## 2019-10-22 VITALS
WEIGHT: 121.9 LBS | HEART RATE: 97 BPM | DIASTOLIC BLOOD PRESSURE: 74 MMHG | BODY MASS INDEX: 19.59 KG/M2 | HEIGHT: 66 IN | TEMPERATURE: 99.2 F | OXYGEN SATURATION: 97 % | SYSTOLIC BLOOD PRESSURE: 120 MMHG

## 2019-10-22 DIAGNOSIS — J45.20 MILD INTERMITTENT ASTHMA WITHOUT COMPLICATION: ICD-10-CM

## 2019-10-22 DIAGNOSIS — M54.50 BILATERAL LOW BACK PAIN WITHOUT SCIATICA, UNSPECIFIED CHRONICITY: ICD-10-CM

## 2019-10-22 DIAGNOSIS — N60.02 CYST OF LEFT NIPPLE: ICD-10-CM

## 2019-10-22 DIAGNOSIS — L20.9 ATOPIC DERMATITIS, UNSPECIFIED TYPE: ICD-10-CM

## 2019-10-22 DIAGNOSIS — F41.1 GENERALIZED ANXIETY DISORDER: Primary | ICD-10-CM

## 2019-10-22 DIAGNOSIS — L20.9 ATOPIC DERMATITIS, UNSPECIFIED TYPE: Primary | ICD-10-CM

## 2019-10-22 DIAGNOSIS — F15.21: ICD-10-CM

## 2019-10-22 PROCEDURE — 99214 OFFICE O/P EST MOD 30 MIN: CPT | Performed by: INTERNAL MEDICINE

## 2019-10-22 RX ORDER — GABAPENTIN 100 MG/1
CAPSULE ORAL
Qty: 60 CAPSULE | Refills: 0 | Status: SHIPPED | OUTPATIENT
Start: 2019-10-22 | End: 2019-11-18

## 2019-10-22 RX ORDER — CITALOPRAM HYDROBROMIDE 20 MG/1
TABLET ORAL
Qty: 30 TABLET | Refills: 2 | Status: SHIPPED | OUTPATIENT
Start: 2019-10-22 | End: 2020-09-11

## 2019-10-22 RX ORDER — ALBUTEROL SULFATE 90 UG/1
2 AEROSOL, METERED RESPIRATORY (INHALATION) EVERY 6 HOURS PRN
Qty: 1 INHALER | Refills: 11 | Status: SHIPPED | OUTPATIENT
Start: 2019-10-22

## 2019-10-22 RX ORDER — TRIAMCINOLONE ACETONIDE 1 MG/ML
LOTION TOPICAL
Qty: 60 ML | Refills: 11 | Status: CANCELLED | OUTPATIENT
Start: 2019-10-22

## 2019-10-22 RX ORDER — TRIAMCINOLONE ACETONIDE 5 MG/G
OINTMENT TOPICAL
Qty: 30 G | Refills: 11 | Status: SHIPPED | OUTPATIENT
Start: 2019-10-22 | End: 2021-02-03

## 2019-10-22 RX ORDER — TACROLIMUS 1 MG/G
OINTMENT TOPICAL 2 TIMES DAILY
Qty: 30 G | Refills: 1 | Status: SHIPPED | OUTPATIENT
Start: 2019-10-22 | End: 2019-10-23

## 2019-10-22 ASSESSMENT — PATIENT HEALTH QUESTIONNAIRE - PHQ9: 5. POOR APPETITE OR OVEREATING: MORE THAN HALF THE DAYS

## 2019-10-22 ASSESSMENT — ANXIETY QUESTIONNAIRES
1. FEELING NERVOUS, ANXIOUS, OR ON EDGE: NEARLY EVERY DAY
3. WORRYING TOO MUCH ABOUT DIFFERENT THINGS: MORE THAN HALF THE DAYS
7. FEELING AFRAID AS IF SOMETHING AWFUL MIGHT HAPPEN: SEVERAL DAYS
2. NOT BEING ABLE TO STOP OR CONTROL WORRYING: MORE THAN HALF THE DAYS
6. BECOMING EASILY ANNOYED OR IRRITABLE: SEVERAL DAYS

## 2019-10-22 ASSESSMENT — MIFFLIN-ST. JEOR: SCORE: 1274.68

## 2019-10-22 NOTE — NURSING NOTE
"Chief Complaint   Patient presents with     Eye Problem     Recheck Medication     gabapentin     /74   Pulse 97   Temp 99.2  F (37.3  C) (Temporal)   Ht 1.676 m (5' 6\")   Wt 55.3 kg (121 lb 14.4 oz)   LMP 10/08/2019   SpO2 97%   BMI 19.68 kg/m   Estimated body mass index is 19.68 kg/m  as calculated from the following:    Height as of this encounter: 1.676 m (5' 6\").    Weight as of this encounter: 55.3 kg (121 lb 14.4 oz).        Health Maintenance due pending provider review:  Pap Smear and ACT    ACT completed  Advised pt to schedule appt for pap/phx    Merari Chester CMA  "

## 2019-10-22 NOTE — TELEPHONE ENCOUNTER
Reason for Call:  Medication or medication refill:    Do you use a [facility]?  Name of the pharmacy and phone number for the current request:  [facility] # [phone]    Name of the medication requested: tacrolimus 0.1% ointment 30gm need alternative     Other request: the preferred alternative is protopic, elidel; send new rx    Can we leave a detailed message on this number?     Phone number patient can be reached at: Other phone number:  See above tele #    Best Time: soon    Call taken on 10/22/2019 at 12:42 PM by Heidi Calabrese

## 2019-10-22 NOTE — PROGRESS NOTES
Subjective     Rachana Senior is a 34 year old female who presents to clinic today for the following health issues:    HPI     Long-standing history of dermatitis, periorbitally but also scattered in her upper extremities.  She also has ocular injection?  Conjunctivitis.  We have advised her in the past to be seen by dermatologist given the severity of her symptoms and and have advised on an ophthalmology exam given the eye symptoms.  She has fortunately done neither.  She is using topical low-dose steroid occasionally on the rash with little effect.  Eye redness      Duration: ongoing     Description (location/character/radiation): redness and watering    Intensity:  moderate    Accompanying signs and symptoms: facial skin irriation and irritation on body    History (similar episodes/previous evaluation): None    Precipitating or alleviating factors: None    Therapies tried and outcome: eye drops, has not used the tiamcinolone cream either         Anxiety Follow-Up  Was on citalopram which she stopped at some point.  Continues to take the gabapentin which we have weaned from 900 3 times daily to 100 mg twice daily in the last 6 to 12 months.  She does think this is effective for her anxiety.      How are you doing with your anxiety since your last visit? Worsened      Are you having other symptoms that might be associated with anxiety? Yes:  Waking up due to anxiety at night    Have you had a significant life event? Job Concerns     Are you feeling depressed? No    Do you have any concerns with your use of alcohol or other drugs? No    Social History     Tobacco Use     Smoking status: Current Every Day Smoker     Packs/day: 1.00     Years: 10.00     Pack years: 10.00     Types: Cigarettes     Smokeless tobacco: Never Used     Tobacco comment: 1 PPD   Substance Use Topics     Alcohol use: No     Drug use: No     Comment: Sober as of April 22 2016     JESSICA-7 SCORE 7/28/2016 4/20/2017 1/11/2019   Total Score 20 13 5  "    PHQ 4/20/2017 9/12/2017 1/11/2019   PHQ-9 Total Score 5 1 4   Q9: Thoughts of better off dead/self-harm past 2 weeks Not at all Not at all Not at all         Asthma Follow-Up    Was ACT completed today?    Yes    ACT Total Scores 10/22/2019   ACT TOTAL SCORE (Goal Greater than or Equal to 20) 20   In the past 12 months, how many times did you visit the emergency room for your asthma without being admitted to the hospital? 0   In the past 12 months, how many times were you hospitalized overnight because of your asthma? 0       How many days per week do you miss taking your asthma controller medication?  7    Please describe any recent triggers for your asthma: Patient is unaware of triggers    Have you had any Emergency Room Visits, Urgent Care Visits, or Hospital Admissions since your last office visit?  No      Reviewed and updated as needed this visit by Provider         Review of Systems   ROS COMP: Constitutional, HEENT, cardiovascular, pulmonary, gi and gu systems are negative, except as otherwise noted.      Objective    /74   Pulse 97   Temp 99.2  F (37.3  C) (Temporal)   Ht 1.676 m (5' 6\")   Wt 55.3 kg (121 lb 14.4 oz)   LMP 10/08/2019   SpO2 97%   BMI 19.68 kg/m    Body mass index is 19.68 kg/m .  Physical Exam   GENERAL APPEARANCE: alert and no distress  EYES: Both eyes are injected, watery chronically irritated appearing.  The mukund-orbital tissue appears dry irritated  HENT: NCAT. mouth without ulcers or lesions  NECK: no adenopathy, no asymmetry, masses, or scars and thyroid normal to palpation  RESP: lungs clear to auscultation - no rales, rhonchi or wheezes  Skin: scattered dry scaly areas on both arms. On L breast there is a cyst on the areola.  Breast: no masses palpated on L breast    Labs reviewed in Epic        Assessment & Plan     1. Generalized anxiety disorder  readd ssri   Cont gabapentin 100 mg bid prn   - citalopram (CELEXA) 20 MG tablet; Take 0.5 tablets (10 mg) by mouth " daily for 6 days, THEN 1 tablet (20 mg) daily.  Dispense: 30 tablet; Refill: 2  - gabapentin (NEURONTIN) 100 MG capsule; TAKE 1 CAPSULE(100 MG) BY MOUTH TWICE DAILY  Dispense: 60 capsule; Refill: 0    2. Atopic dermatitis, unspecified type  Once again recd she see derm  - DERMATOLOGY REFERRAL  - tacrolimus (PROTOPIC) 0.1 % external ointment; Apply topically 2 times daily To affected area on face. rub in gently and completely. Discontinue use when symptoms have cleared  Dispense: 30 g; Refill: 1  - triamcinolone (KENALOG) 0.5 % external ointment; To affected area on extremities only (not face). Discontinue use when rash resolves until recurrence.  Dispense: 30 g; Refill: 11    3. Mild intermittent asthma without complication  - albuterol (PROAIR HFA/PROVENTIL HFA/VENTOLIN HFA) 108 (90 Base) MCG/ACT inhaler; Inhale 2 puffs into the lungs every 6 hours as needed for shortness of breath / dyspnea or wheezing  Dispense: 1 Inhaler; Refill: 11    4. 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 Cyst of left nipple  Derm     6. Bilateral low back pain without sciatica, unspecified chronicity  - gabapentin (NEURONTIN) 100 MG capsule; TAKE 1 CAPSULE(100 MG) BY MOUTH TWICE DAILY  Dispense: 60 capsule; Refill: 0     Tobacco Cessation:   reports that she has been smoking cigarettes. She has a 10.00 pack-year smoking history. She has never used smokeless tobacco.  Tobacco Cessation Action Plan: Information offered: Patient not interested at this time        See Patient Instructions    No follow-ups on file.    Jesus Macias MD  [facility]

## 2019-10-22 NOTE — PATIENT INSTRUCTIONS
Your provider [facility] referred you to:  [facility] [phone] [email or web address]

## 2019-10-23 RX ORDER — PIMECROLIMUS 10 MG/G
CREAM TOPICAL 2 TIMES DAILY
Qty: 30 G | Refills: 2 | Status: SHIPPED | OUTPATIENT
Start: 2019-10-23 | End: 2023-03-02

## 2019-10-23 ASSESSMENT — ASTHMA QUESTIONNAIRES: ACT_TOTALSCORE: 20

## 2019-10-24 DIAGNOSIS — H10.45 CHRONIC ALLERGIC CONJUNCTIVITIS: ICD-10-CM

## 2019-10-24 NOTE — TELEPHONE ENCOUNTER
Requested Prescriptions   Pending Prescriptions Disp Refills     cromolyn (OPTICROM) 4 % ophthalmic solution [Pharmacy Med Name: CROMOLYN SODIUM 4% OPHTH SOLN 10ML] 10 mL 0     Sig: INSTILL 1 DROP IN BOTH EYES FOUR TIMES DAILY       There is no refill protocol information for this order        Last Written Prescription Date: 9/16/2019  Last Fill Quantity: 10ML,  # refills: 0   Last Office Visit: 10/22/2019   Future Office Visit:    Next 5 appointments (look out 90 days)    Dec 12, 2019  5:40 PM CST  PHYSICAL with Jesus Macias MD  [facility] ([facility] [address] [address] [phone]

## 2019-10-25 ENCOUNTER — TELEPHONE (OUTPATIENT)
Dept: INTERNAL MEDICINE | Facility: CLINIC | Age: 34
End: 2019-10-25

## 2019-10-25 RX ORDER — CROMOLYN SODIUM 40 MG/ML
SOLUTION/ DROPS OPHTHALMIC
Qty: 10 ML | Refills: 0 | Status: SHIPPED | OUTPATIENT
Start: 2019-10-25 | End: 2019-12-11

## 2019-10-25 NOTE — TELEPHONE ENCOUNTER
Prior Authorization Retail Medication Request    Medication/Dose: pimecrolimus 1% ext crm (CLIFF) 60gm  ICD code (if different than what is on RX):  L20.9  Previously Tried and Failed:    Rationale:      Insurance Name:  [employer] [facility] [phone]  Insurance ID:  [id number]      Pharmacy Information (if different than what is on RX)  Name:  [facility] [address]  Phone:  [phone]

## 2019-10-28 NOTE — TELEPHONE ENCOUNTER
Central Prior Authorization Team   Phone: [phone]      Prior Authorization Not Needed per Insurance    10/28/2019  Medication: pimecrolimus 1% ext crm (CLIFF) 60gm - NOT NEEDED  Insurance Company: [employer] - Phone [phone] Fax [phone]  Expected CoPay:      Pharmacy Filling the Rx: [id number] [address] [place] [address] [place] [facility] [address] [place]  Pharmacy Notified: Yes  Patient Notified: Yes (**Instructed pharmacy to notify patient when script is ready to /ship.**)    Insurance preferred alternative is Brand Elidel.  Rx ran for Brand Elidel and received a paid claim.  Pharmacy ordering medication and will arrive after 2pm on 10/29/2019.  Notified patient medication is being ordered and pharmacy will notify when ready for pick-up.

## 2019-11-05 ENCOUNTER — TELEPHONE (OUTPATIENT)
Dept: INTERNAL MEDICINE | Facility: CLINIC | Age: 34
End: 2019-11-05

## 2019-11-05 NOTE — TELEPHONE ENCOUNTER
Has phx appt with MR 12/12/2019    Summary:    Patient is due/failing the following:   PAP    Type of outreach:  none\  Action needed: Patient needs office visit for phx.    If need for provider review:    Please indicate OV, lab, MTM, or nurse appt if needed.  Indicate fasting or not fasting.                                                                                                                                          Merair Chester, CMA

## 2019-11-18 DIAGNOSIS — M54.50 BILATERAL LOW BACK PAIN WITHOUT SCIATICA, UNSPECIFIED CHRONICITY: ICD-10-CM

## 2019-11-18 DIAGNOSIS — F41.1 GENERALIZED ANXIETY DISORDER: ICD-10-CM

## 2019-11-19 RX ORDER — GABAPENTIN 100 MG/1
CAPSULE ORAL
Qty: 60 CAPSULE | Refills: 0 | Status: SHIPPED | OUTPATIENT
Start: 2019-11-19 | End: 2019-12-11

## 2019-11-19 NOTE — TELEPHONE ENCOUNTER
gabapentin      Last Written Prescription Date:  10/22/19  Last Fill Quantity: 60,   # refills: 0  Last Office Visit: 10/22/19  Future Office visit:    Next 5 appointments (look out 90 days)    Dec 12, 2019  5:40 PM CST  PHYSICAL with Jesus Macias MD  [facility] ([facility]) [address] [place] [id number] [phone]           Routing refill request to provider for review/approval because:  Drug not on the [facility], [facility] or [facility] refill protocol or controlled substance

## 2019-12-11 DIAGNOSIS — F41.1 GENERALIZED ANXIETY DISORDER: ICD-10-CM

## 2019-12-11 DIAGNOSIS — M54.50 BILATERAL LOW BACK PAIN WITHOUT SCIATICA, UNSPECIFIED CHRONICITY: ICD-10-CM

## 2019-12-11 DIAGNOSIS — H10.45 CHRONIC ALLERGIC CONJUNCTIVITIS: ICD-10-CM

## 2019-12-11 RX ORDER — GABAPENTIN 100 MG/1
CAPSULE ORAL
Qty: 60 CAPSULE | Refills: 0 | Status: SHIPPED | OUTPATIENT
Start: 2019-12-11 | End: 2020-01-27

## 2019-12-11 RX ORDER — CROMOLYN SODIUM 40 MG/ML
SOLUTION/ DROPS OPHTHALMIC
Qty: 10 ML | Refills: 0 | Status: SHIPPED | OUTPATIENT
Start: 2019-12-11 | End: 2020-01-07

## 2019-12-11 NOTE — TELEPHONE ENCOUNTER
Requested Prescriptions   Pending Prescriptions Disp Refills     gabapentin (NEURONTIN) 100 MG capsule [Pharmacy Med Name: GABAPENTIN 100MG CAPSULES] 60 capsule 0     Sig: TAKE 1 CAPSULE(100 MG) BY MOUTH TWICE DAILY       There is no refill protocol information for this order   Last Written Prescription Date:  11/19/2019  Last Fill Quantity: 60,  # refills: 0   Last office visit: 10/22/2019 with prescribing provider:  Jesus Macias     Future Office Visit:   Next 5 appointments (look out 90 days)    Dec 12, 2019  5:40 PM CST  PHYSICAL with Jesus Macias MD  [facility] ([facility]) [address] [place] [id number]  [phone]           Routing refill request to provider for review/approval because:  Drug not on the [facility] refill protocol          cromolyn (OPTICROM) 4 % ophthalmic solution [Pharmacy Med Name: CROMOLYN SODIUM 4% OPHTH SOLN 10ML] 10 mL 0     Sig: INSTILL 1 DROP IN BOTH EYES FOUR TIMES DAILY       There is no refill protocol information for this order        Last Written Prescription Date:  10/25/2019  Last Fill Quantity: 10,  # refills: 0   Last office visit: 10/22/2019 with prescribing provider:  Jesus Macias     Future Office Visit:   Next 5 appointments (look out 90 days)    Dec 12, 2019  5:40 PM CST  PHYSICAL with Jesus Macias MD  [facility] ([facility]) [address] [place] [id number]  [phone]           Routing refill request to provider for review/approval because:  Drug not on the [facility] refill protocol     Faiza LUCASN, RN, PHN

## 2020-01-05 DIAGNOSIS — H10.45 CHRONIC ALLERGIC CONJUNCTIVITIS: ICD-10-CM

## 2020-01-06 NOTE — TELEPHONE ENCOUNTER
Requested Prescriptions   Pending Prescriptions Disp Refills     cromolyn (OPTICROM) 4 % ophthalmic solution [Pharmacy Med Name: CROMOLYN SODIUM 4% OPHTH SOLN 10ML] 10 mL 0     Sig: INSTILL 1 DROP IN BOTH EYES FOUR TIMES DAILY       There is no refill protocol information for this order        Last Written Prescription Date:  12/11/2019  Last Fill Quantity: 10mL,  # refills: 0   Last Office Visit: 10/22/2019   Future Office Visit:

## 2020-01-07 RX ORDER — CROMOLYN SODIUM 40 MG/ML
SOLUTION/ DROPS OPHTHALMIC
Qty: 10 ML | Refills: 0 | Status: SHIPPED | OUTPATIENT
Start: 2020-01-07 | End: 2020-03-23

## 2020-01-23 DIAGNOSIS — M54.50 BILATERAL LOW BACK PAIN WITHOUT SCIATICA, UNSPECIFIED CHRONICITY: ICD-10-CM

## 2020-01-23 DIAGNOSIS — F41.1 GENERALIZED ANXIETY DISORDER: ICD-10-CM

## 2020-01-23 NOTE — TELEPHONE ENCOUNTER
Requested Prescriptions   Pending Prescriptions Disp Refills     gabapentin (NEURONTIN) 100 MG capsule [Pharmacy Med Name: GABAPENTIN 100MG CAPSULES]  Last Written Prescription Date:  12/11/2019  Last Fill Quantity: 60,  # refills: 0   Last Office Visit: 10/22/2019   Future Office Visit:      60 capsule 0     Sig: TAKE 1 CAPSULE(100 MG) BY MOUTH TWICE DAILY       There is no refill protocol information for this order

## 2020-01-27 RX ORDER — GABAPENTIN 100 MG/1
CAPSULE ORAL
Qty: 60 CAPSULE | Refills: 2 | Status: SHIPPED | OUTPATIENT
Start: 2020-01-27 | End: 2020-04-28

## 2020-03-23 DIAGNOSIS — H10.45 CHRONIC ALLERGIC CONJUNCTIVITIS: ICD-10-CM

## 2020-03-23 RX ORDER — CROMOLYN SODIUM 40 MG/ML
SOLUTION/ DROPS OPHTHALMIC
Qty: 10 ML | Refills: 11 | Status: SHIPPED | OUTPATIENT
Start: 2020-03-23 | End: 2021-05-24

## 2020-03-23 NOTE — TELEPHONE ENCOUNTER
Requested Prescriptions   Pending Prescriptions Disp Refills     cromolyn (OPTICROM) 4 % ophthalmic solution 10 mL 0       There is no refill protocol information for this order      Last Written Prescription Date:  01/07/20  Last Fill Quantity: 10mL,  # refills: 0   Last office visit: 10/22/2019 with prescribing provider:  10/22/19   Future Office Visit:  0

## 2020-04-16 DIAGNOSIS — F41.1 GENERALIZED ANXIETY DISORDER: ICD-10-CM

## 2020-04-16 DIAGNOSIS — M54.50 BILATERAL LOW BACK PAIN WITHOUT SCIATICA, UNSPECIFIED CHRONICITY: ICD-10-CM

## 2020-04-16 RX ORDER — GABAPENTIN 100 MG/1
CAPSULE ORAL
Qty: 60 CAPSULE | Refills: 2 | Status: CANCELLED | OUTPATIENT
Start: 2020-04-16

## 2020-04-16 NOTE — TELEPHONE ENCOUNTER
Gabapentin      Last Written Prescription Date:  1/27/2020  Last Fill Quantity: 60,   # refills: 0  Last Office Visit: 10/22/2019  Future Office visit:       Routing refill request to provider for review/approval because:  Drug not on the [facility], [facility] or [facility] refill protocol or controlled substance    Faiza NEGRETE, RN, PHN

## 2020-04-28 RX ORDER — GABAPENTIN 100 MG/1
100 CAPSULE ORAL 2 TIMES DAILY
Qty: 60 CAPSULE | Refills: 2 | Status: SHIPPED | OUTPATIENT
Start: 2020-04-28 | End: 2020-07-27

## 2020-05-27 ENCOUNTER — OFFICE VISIT (OUTPATIENT)
Dept: URGENT CARE | Facility: URGENT CARE | Age: 35
End: 2020-05-27

## 2020-05-27 VITALS
SYSTOLIC BLOOD PRESSURE: 120 MMHG | HEART RATE: 80 BPM | DIASTOLIC BLOOD PRESSURE: 70 MMHG | BODY MASS INDEX: 19.53 KG/M2 | TEMPERATURE: 97.7 F | RESPIRATION RATE: 16 BRPM | WEIGHT: 121 LBS | OXYGEN SATURATION: 100 %
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
## 2020-05-27 PROCEDURE — 99214 OFFICE O/P EST MOD 30 MIN: CPT | Mod: 25 | Performed by: FAMILY MEDICINE

## 2020-05-27 PROCEDURE — 96372 THER/PROPH/DIAG INJ SC/IM: CPT | Performed by: FAMILY MEDICINE

## 2020-05-27 RX ORDER — HYDROCODONE BITARTRATE AND ACETAMINOPHEN 5; 325 MG/1; MG/1
1 TABLET ORAL EVERY 6 HOURS PRN
Qty: 8 TABLET | Refills: 0 | Status: SHIPPED | OUTPATIENT
Start: 2020-05-27 | End: 2020-05-29

## 2020-05-27 RX ORDER — CEFTRIAXONE SODIUM 1 G
1 VIAL (EA) INJECTION ONCE
Status: COMPLETED | OUTPATIENT
Start: 2020-05-27 | End: 2020-05-27

## 2020-05-27 RX ADMIN — Medication 1 G: at 14:43

## 2020-05-27 NOTE — PROGRESS NOTES
SUBJECTIVE:  Rachana Senior is a 34 year old female who presents with a chief complaint of an animal bite on the hands right.  She was bitten by a cat two days ago.   Cicumstances of bite: unprovoked attack.  Severity: moderate.  Animal's immunizations up to date   Associated symptoms: immediate pain    last tetanus booster almost 10 years ago    Past Medical History:   Diagnosis Date     Depressive disorder, not elsewhere classified 8/9/2006     GENERALIZED ANXIETY DIS 10/31/2006     Insufficient prenatal care 6/20/2006     Mild hyperemesis gravidarum, antepartum 6/27/2006     Seizures (H)      Tobacco use disorder 8/9/2006     Unspecified asthma(493.90)     Asthma       Current Outpatient Medications:      albuterol (PROAIR HFA/PROVENTIL HFA/VENTOLIN HFA) 108 (90 Base) MCG/ACT inhaler, Inhale 2 puffs into the lungs every 6 hours as needed for shortness of breath / dyspnea or wheezing, Disp: 1 Inhaler, Rfl: 11     amoxicillin-clavulanate (AUGMENTIN) 875-125 MG tablet, Take 1 tablet by mouth 2 times daily for 10 days, Disp: 20 tablet, Rfl: 0     citalopram (CELEXA) 20 MG tablet, Take 0.5 tablets (10 mg) by mouth daily for 6 days, THEN 1 tablet (20 mg) daily., Disp: 30 tablet, Rfl: 2     cromolyn (OPTICROM) 4 % ophthalmic solution, INSTILL 1 DROP IN BOTH EYES FOUR TIMES DAILY, Disp: 10 mL, Rfl: 11     fluticasone (FLONASE) 50 MCG/ACT nasal spray, Spray 1-2 sprays into both nostrils daily, Disp: 48 mL, Rfl: 1     gabapentin (NEURONTIN) 100 MG capsule, Take 1 capsule (100 mg) by mouth 2 times daily, Disp: 60 capsule, Rfl: 2     HYDROcodone-acetaminophen (NORCO) 5-325 MG tablet, Take 1 tablet by mouth every 6 hours as needed for pain, Disp: 8 tablet, Rfl: 0     ketotifen (ZADITOR) 0.025 % ophthalmic solution, Place 1 drop into both eyes every 12 hours, Disp: 1 Bottle, Rfl: 11     pimecrolimus (ELIDEL) 1 % external cream, Apply topically 2 times daily Apply thin layer. Discontinue when symptoms resolve, Disp: 30 g, Rfl:  2     triamcinolone (KENALOG) 0.5 % external ointment, To affected area on extremities only (not face). Discontinue use when rash resolves until recurrence., Disp: 30 g, Rfl: 11    Current Facility-Administered Medications:      cefTRIAXone (ROCEPHIN) injection 1 g, 1 g, Intramuscular, Once, Manohar Cramer, DO  Social History     Tobacco Use     Smoking status: Current Every Day Smoker     Packs/day: 1.00     Years: 10.00     Pack years: 10.00     Types: Cigarettes     Smokeless tobacco: Never Used     Tobacco comment: 1 PPD   Substance Use Topics     Alcohol use: No       ROS:  CONSTITUTIONAL:NEGATIVE for fever, chills, change in weight    OBJECTIVE:  /70   Pulse 80   Temp 97.7  F (36.5  C) (Tympanic)   Resp 16   Wt 54.9 kg (121 lb)   SpO2 100%   BMI 19.53 kg/m    GENERAL: healthy, alert no acute distress  SKIN: puncture wound, erythema, swelling and tenderness to palpation of hands right and thumb  MS:extremities normal- no gross deformities noted,  FROM noted in all extremities  NEURO: Normal strength and tone, sensory exam grossly normal,  normal speech and mentation    ASSESSMENT:    ICD-10-CM    1. Cat bite, initial encounter  W55.01XA cefTRIAXone (ROCEPHIN) injection 1 g     amoxicillin-clavulanate (AUGMENTIN) 875-125 MG tablet     HYDROcodone-acetaminophen (NORCO) 5-325 MG tablet     TDAP, IM (10 - 64 YRS) - Adacel     Pt PP and would likelto f/u with PCP rather than Ortho  ED if worse

## 2020-05-27 NOTE — PROGRESS NOTES
Clinic Administered Medication Documentation      Injectable Medication Documentation    Patient was given Ceftriaxone Sodium (Rocephin). Prior to medication administration, verified patients identity using patient s name and date of birth. Please see MAR and medication order for additional information. Patient instructed to remain in clinic for 15 minutes.      Was entire vial of medication used? Yes  Vial/Syringe: Single dose vial  Expiration Date:  04/2022  Was this medication supplied by the patient? No     Clinic Administered Medication Documentation      Injectable Medication Documentation    Patient was given TTdap. Prior to medication administration, verified patients identity using patient s name and date of birth. Please see MAR and medication order for additional information. Patient instructed to remain in clinic for 15 minutes.      Was entire vial of medication used? Yes  Vial/Syringe: Single dose vial  Expiration Date:  07/226/2021  Was this medication supplied by the patient? No

## 2020-05-27 NOTE — PATIENT INSTRUCTIONS
Patient Education     Cat Bite    A cat bite can cause a wound deep enough to break the skin. In such cases, the wound is cleaned and then sometimes closed. If the wound is closed it is usually not closed completely. This is so that fluid can drain if the wound becomes infected. Often the wound is left open to heal. In addition to wound care, a tetanus shot may be given, if needed.  Home care    Wash your hands well with soap and warm water before and after caring for the wound. This helps lower the risk of infection.    Care for the wound as directed. If a dressing was applied to the wound, be sure to change it as directed.    If the wound bleeds, place a clean, soft cloth on the wound. Then firmly apply pressure until the bleeding stops. This may take up to 5 minutes. Don't release the pressure and look at the wound during this time.    Always get medical attention for cat bites on the hand. They are highly likely to become infected.    Most wounds heal within 10 days. But an infection can occur even with proper treatment. So be sure to check the wound daily for signs of infection (see below).    Antibiotics may be prescribed. These help prevent or treat infection. If you re given antibiotics, take them as directed. Also be sure to complete the medicines.  Rabies prevention  Rabies is a virus that can be carried in certain animals. These can include domestic animals such as cats and dogs. Pets fully vaccinated against rabies (2 shots) are at very low risk of infection. But because human rabies is almost always fatal, any biting pet should be confined for 10 days as an extra precaution. In general, if there is a risk for rabies, the following steps may need to be taken:    If someone s pet cat has bitten you, it should be kept in a secure area for the next 10 days to watch for signs of illness. If the pet owner won t allow this, contact your local animal control center. If the cat becomes ill or dies during that  time, contact your local animal control center at once so the animal may be tested for rabies. If the cat stays healthy for the next 10 days, there is no danger of rabies in the animal or you.    If a stray cat bit you, contact your local animal control center. They can give information on capture, quarantine, and animal rabies testing.    If you can t find the animal that bit you in the next 2 days, and if rabies exists in your area, you may need to receive the rabies vaccine series. Call your healthcare provider right away. Or return to the emergency department promptly.    All animal bites should be reported to the local animal control center. If you were not given a form to fill out, you can report this yourself.  Follow-up care  Follow up with your healthcare provider, or as directed.  When to seek medical advice  Call your healthcare provider right away if any of these occur:    Signs of infection:  ? Spreading redness or warmth from the wound  ? Increased pain or swelling  ? Fever of 100.4 F (38 C) or higher, or as directed by your healthcare provider  ? Colored fluid or pus draining from the wound  ? Enlarged lymph nodes above the area that was bitten, such as lymph nodes in the armpit if you were bitten on the hand or arm. This may be a sign of cat-scratch disease (cat-scratch fever).    Signs of rabies infection:  ? Headache  ? Confusion  ? Strange behavior  ? Increased salivating or drooling  ? Seizure    Decreased ability to move any body part near the bite area    Bleeding that can't be stopped after 5 minutes of firm pressure  Date Last Reviewed: 5/1/2018 2000-2019 [employer]. [address] [place] [address], [place] [address]. All rights reserved. This information is not intended as a substitute for professional medical care. Always follow your healthcare professional's instructions.

## 2020-05-29 ENCOUNTER — TELEPHONE (OUTPATIENT)
Dept: INTERNAL MEDICINE | Facility: CLINIC | Age: 35
End: 2020-05-29

## 2020-05-29 NOTE — TELEPHONE ENCOUNTER
Recommendation was today f/u  If can do virtual visit with provider later today - has to be video. otherwise video visit early next week.

## 2020-05-29 NOTE — TELEPHONE ENCOUNTER
Reason for Call:  Other call back    Detailed comments: Rachana was in Urgent Care on 5/27/2020 for a cat bite. It is somewhat improving, yet .    She asks for an in office appointment.     Question: in office or virtual appointment.    Phone Number Patient can be reached at: Cell number on file:    Telephone Information:   Mobile [phone]       Best Time: any    Can we leave a detailed message on this number? YES    Call taken on 5/29/2020 at 2:47 PM by Stefania Prado

## 2020-06-22 PROCEDURE — 90715 TDAP VACCINE 7 YRS/> IM: CPT | Performed by: FAMILY MEDICINE

## 2020-06-22 PROCEDURE — 90471 IMMUNIZATION ADMIN: CPT | Performed by: FAMILY MEDICINE

## 2020-07-13 DIAGNOSIS — M54.50 BILATERAL LOW BACK PAIN WITHOUT SCIATICA, UNSPECIFIED CHRONICITY: ICD-10-CM

## 2020-07-13 DIAGNOSIS — H10.45 CHRONIC ALLERGIC CONJUNCTIVITIS: ICD-10-CM

## 2020-07-13 DIAGNOSIS — F41.1 GENERALIZED ANXIETY DISORDER: ICD-10-CM

## 2020-07-14 NOTE — TELEPHONE ENCOUNTER
gabapentin      Last Written Prescription Date:  4/28/20  Last Fill Quantity: 60,   # refills: 2  Last Office Visit: 10/22/19  Future Office visit:       Routing refill request to provider for review/approval because:  Drug not on the [facility] or [facility] refill protocol or controlled substance

## 2020-07-27 RX ORDER — GABAPENTIN 100 MG/1
100 CAPSULE ORAL 2 TIMES DAILY
Qty: 60 CAPSULE | Refills: 0 | Status: SHIPPED | OUTPATIENT
Start: 2020-07-27 | End: 2020-09-11

## 2020-09-11 ENCOUNTER — VIRTUAL VISIT (OUTPATIENT)
Dept: INTERNAL MEDICINE | Facility: CLINIC | Age: 35
End: 2020-09-11

## 2020-09-11 ENCOUNTER — E-VISIT (OUTPATIENT)
Dept: INTERNAL MEDICINE | Facility: CLINIC | Age: 35
End: 2020-09-11

## 2020-09-11 DIAGNOSIS — M54.50 BILATERAL LOW BACK PAIN WITHOUT SCIATICA, UNSPECIFIED CHRONICITY: ICD-10-CM

## 2020-09-11 DIAGNOSIS — Z20.09 CONTACT WITH AND (SUSPECTED) EXPOSURE TO OTHER INTESTINAL INFECTIOUS DISEASES: ICD-10-CM

## 2020-09-11 DIAGNOSIS — F41.1 GENERALIZED ANXIETY DISORDER: ICD-10-CM

## 2020-09-11 DIAGNOSIS — F41.1 GENERALIZED ANXIETY DISORDER: Primary | ICD-10-CM

## 2020-09-11 DIAGNOSIS — Z53.9 ERRONEOUS ENCOUNTER--DISREGARD: Primary | ICD-10-CM

## 2020-09-11 PROCEDURE — 99214 OFFICE O/P EST MOD 30 MIN: CPT | Mod: GT | Performed by: INTERNAL MEDICINE

## 2020-09-11 RX ORDER — GABAPENTIN 100 MG/1
100 CAPSULE ORAL 2 TIMES DAILY
Qty: 60 CAPSULE | Refills: 0 | Status: SHIPPED | OUTPATIENT
Start: 2020-09-11 | End: 2020-10-13

## 2020-09-11 RX ORDER — BUSPIRONE HYDROCHLORIDE 5 MG/1
TABLET ORAL
Qty: 60 TABLET | Refills: 0 | Status: SHIPPED | OUTPATIENT
Start: 2020-09-11 | End: 2020-10-06

## 2020-09-11 ASSESSMENT — ASTHMA QUESTIONNAIRES
QUESTION_4 LAST FOUR WEEKS HOW OFTEN HAVE YOU USED YOUR RESCUE INHALER OR NEBULIZER MEDICATION (SUCH AS ALBUTEROL): NOT AT ALL
QUESTION_1 LAST FOUR WEEKS HOW MUCH OF THE TIME DID YOUR ASTHMA KEEP YOU FROM GETTING AS MUCH DONE AT WORK, SCHOOL OR AT HOME: NONE OF THE TIME
QUESTION_5 LAST FOUR WEEKS HOW WOULD YOU RATE YOUR ASTHMA CONTROL: SOMEWHAT CONTROLLED
ACT_TOTALSCORE: 22
QUESTION_2 LAST FOUR WEEKS HOW OFTEN HAVE YOU HAD SHORTNESS OF BREATH: ONCE OR TWICE A WEEK
QUESTION_3 LAST FOUR WEEKS HOW OFTEN DID YOUR ASTHMA SYMPTOMS (WHEEZING, COUGHING, SHORTNESS OF BREATH, CHEST TIGHTNESS OR PAIN) WAKE YOU UP AT NIGHT OR EARLIER THAN USUAL IN THE MORNING: NOT AT ALL

## 2020-09-11 ASSESSMENT — PATIENT HEALTH QUESTIONNAIRE - PHQ9: SUM OF ALL RESPONSES TO PHQ QUESTIONS 1-9: 20

## 2020-09-11 NOTE — NURSING NOTE
"Chief Complaint   Patient presents with     Recheck Medication     gabapentin     There were no vitals taken for this visit. Estimated body mass index is 19.53 kg/m  as calculated from the following:    Height as of 10/22/19: 1.676 m (5' 6\").    Weight as of 5/27/20: 54.9 kg (121 lb).        Health Maintenance due pending provider review:  Pap Smear, PHQ9 and ACT    ACT completed, PHQ completed, pt aware due for pap,     Merari Chester CMA  "

## 2020-09-11 NOTE — TELEPHONE ENCOUNTER
Reason for Call:  Medication or medication refill:    Do you use a [facility]?  Name of the pharmacy and phone number for the current request:  gabapentin (NEURONTIN) 100 MG capsule    Name of the medication requested: had a virtual visit with Dr. Macias this AM and forgot to mention Medication refill.  Out of her gabapentin.      Other request: none    Can we leave a detailed message on this number? YES    Phone number patient can be reached at: Home number on file [phone] (home)    Best Time: needs medication today    Call taken on 9/11/2020 at 8:59 AM by CHRISTIE GOMEZ

## 2020-09-11 NOTE — PROGRESS NOTES
"Rachana Senior is a 35 year old female who is being evaluated via a billable video visit.      The patient has been notified of following:     \"This video visit will be conducted via a call between you and your physician/provider. We have found that certain health care needs can be provided without the need for an in-person physical exam.  This service lets us provide the care you need with a video conversation.  If a prescription is necessary we can send it directly to your pharmacy.  If lab work is needed we can place an order for that and you can then stop by our lab to have the test done at a later time.    Video visits are billed at different rates depending on your insurance coverage.  Please reach out to your insurance provider with any questions.    If during the course of the call the physician/provider feels a video visit is not appropriate, you will not be charged for this service.\"    Patient has given verbal consent for Video visit? Yes  How would you like to obtain your AVS? MyChart  If you are dropped from the video visit, the video invite should be resent to: Text to cell phone: [phone]  Will anyone else be joining your video visit? No    Subjective     Rachana Senior is a 35 year old female who presents today via video visit for the following health issues:    HPI    Medication Followup of gabapentin    Taking Medication as prescribed: yes    Side Effects:  None    Medication Helping Symptoms:  yes     Pt also has direct exposure to c.diff, unsure if having sx         Video Start Time: 804        Review of Systems   Constitutional, HEENT, cardiovascular, pulmonary, gi and gu systems are negative, except as otherwise noted.      Objective           Vitals:  No vitals were obtained today due to virtual visit.    Physical Exam     GENERAL: Healthy, alert and no distress  EYES: Eyes grossly normal to inspection.  No discharge or erythema, or obvious scleral/conjunctival abnormalities.  RESP: No " audible wheeze, cough, or visible cyanosis.  No visible retractions or increased work of breathing.    SKIN: Visible skin clear. No significant rash, abnormal pigmentation or lesions.  NEURO: Cranial nerves grossly intact.  Mentation and speech appropriate for age.  PSYCH: mentation appears normal and anxious              Assessment & Plan     Generalized anxiety disorder  Start buspar. F/u via [facility] in 1-2 wks  Consider adding effexor at f/u  Intolerant so far to serveral ssri  - busPIRone (BUSPAR) 5 MG tablet  Dispense: 60 tablet; Refill: 0    Contact with and (suspected) exposure to other intestinal infectious diseases  - c. Diff  Hygiene , cleaning.         See Patient Instructions    No follow-ups on file.    Jesus Macias MD  [facility]      Video-Visit Details    Type of service:  Video Visit    Video End Time:8:33 AM    Originating Location (pt. Location): Home    Distant Location (provider location):  [facility]     Platform used for Video Visit: Arabella

## 2020-09-12 ASSESSMENT — ASTHMA QUESTIONNAIRES: ACT_TOTALSCORE: 22

## 2020-10-06 ENCOUNTER — MYC MEDICAL ADVICE (OUTPATIENT)
Dept: INTERNAL MEDICINE | Facility: CLINIC | Age: 35
End: 2020-10-06

## 2020-10-06 DIAGNOSIS — F41.1 GENERALIZED ANXIETY DISORDER: ICD-10-CM

## 2020-10-06 RX ORDER — BUSPIRONE HYDROCHLORIDE 5 MG/1
10 TABLET ORAL 2 TIMES DAILY
Start: 2020-10-06 | End: 2020-10-13

## 2020-10-11 ENCOUNTER — MYC MEDICAL ADVICE (OUTPATIENT)
Dept: INTERNAL MEDICINE | Facility: CLINIC | Age: 35
End: 2020-10-11

## 2020-10-11 DIAGNOSIS — F41.1 GENERALIZED ANXIETY DISORDER: ICD-10-CM

## 2020-10-11 DIAGNOSIS — M54.50 BILATERAL LOW BACK PAIN WITHOUT SCIATICA, UNSPECIFIED CHRONICITY: ICD-10-CM

## 2020-10-13 RX ORDER — GABAPENTIN 100 MG/1
100 CAPSULE ORAL 2 TIMES DAILY
Qty: 60 CAPSULE | Refills: 0 | Status: SHIPPED | OUTPATIENT
Start: 2020-10-13 | End: 2020-10-29

## 2020-10-13 RX ORDER — BUSPIRONE HYDROCHLORIDE 5 MG/1
10 TABLET ORAL 2 TIMES DAILY
Qty: 120 TABLET | Refills: 1 | Status: SHIPPED | OUTPATIENT
Start: 2020-10-13 | End: 2021-08-20

## 2020-10-13 NOTE — TELEPHONE ENCOUNTER
Gabapentin      Last Written Prescription Date:  9/11/2020  Last Fill Quantity: 60,   # refills: 0  Last Office Visit: 9/11/2020- virtual visit  Future Office visit:       Routing refill request to provider for review/approval because:  Drug not on the [facility], [facility] or [facility] refill protocol or controlled substance    Buspar- please see [employer] message with update. Patient is out of medication.    Faiza NEGRETE, RN, PHN

## 2020-10-26 DIAGNOSIS — F41.1 GENERALIZED ANXIETY DISORDER: ICD-10-CM

## 2020-10-26 DIAGNOSIS — M54.50 BILATERAL LOW BACK PAIN WITHOUT SCIATICA, UNSPECIFIED CHRONICITY: ICD-10-CM

## 2020-10-27 NOTE — TELEPHONE ENCOUNTER
gabapentin      Last Written Prescription Date:  10/13/20  Last Fill Quantity: 60,   # refills: 0  Last Office Visit: 9/11/20  Future Office visit:       Routing refill request to provider for review/approval because:  Drug not on the [facility], [facility] or [facility] refill protocol or controlled substance

## 2020-10-29 RX ORDER — GABAPENTIN 100 MG/1
100 CAPSULE ORAL 2 TIMES DAILY
Qty: 60 CAPSULE | Refills: 0 | Status: SHIPPED | OUTPATIENT
Start: 2020-10-29 | End: 2020-12-09

## 2020-12-08 DIAGNOSIS — F41.1 GENERALIZED ANXIETY DISORDER: ICD-10-CM

## 2020-12-08 DIAGNOSIS — M54.50 BILATERAL LOW BACK PAIN WITHOUT SCIATICA, UNSPECIFIED CHRONICITY: ICD-10-CM

## 2020-12-09 RX ORDER — GABAPENTIN 100 MG/1
100 CAPSULE ORAL 2 TIMES DAILY
Qty: 60 CAPSULE | Refills: 5 | Status: SHIPPED | OUTPATIENT
Start: 2020-12-09 | End: 2021-06-09

## 2020-12-09 NOTE — TELEPHONE ENCOUNTER
gabapentin      Last Written Prescription Date:  10/29/20  Last Fill Quantity: 60,   # refills: 0  Last Office Visit: 9/11/20  Future Office visit:       Routing refill request to provider for review/approval because:  Drug not on the [facility], [facility] or [facility] refill protocol or controlled substance

## 2021-01-14 ENCOUNTER — HEALTH MAINTENANCE LETTER (OUTPATIENT)
Age: 36
End: 2021-01-14

## 2021-02-01 DIAGNOSIS — L20.9 ATOPIC DERMATITIS, UNSPECIFIED TYPE: ICD-10-CM

## 2021-02-03 ENCOUNTER — TELEPHONE (OUTPATIENT)
Dept: INTERNAL MEDICINE | Facility: CLINIC | Age: 36
End: 2021-02-03

## 2021-02-03 DIAGNOSIS — L20.9 ATOPIC DERMATITIS, UNSPECIFIED TYPE: ICD-10-CM

## 2021-02-03 RX ORDER — TRIAMCINOLONE ACETONIDE 5 MG/G
OINTMENT TOPICAL
Qty: 30 G | Refills: 11 | Status: SHIPPED | OUTPATIENT
Start: 2021-02-03 | End: 2021-02-03

## 2021-02-03 RX ORDER — TRIAMCINOLONE ACETONIDE 5 MG/G
OINTMENT TOPICAL
Qty: 30 G | Refills: 11 | Status: SHIPPED | OUTPATIENT
Start: 2021-02-03 | End: 2023-03-02

## 2021-02-03 NOTE — TELEPHONE ENCOUNTER
Pharmacy calling. Needs clarification on triamcinolone sent today. Needs a frequency like once daily or twice daily or whatever is appropriate. Call back with clarification or send a new script .

## 2021-05-22 DIAGNOSIS — H10.45 CHRONIC ALLERGIC CONJUNCTIVITIS: ICD-10-CM

## 2021-05-24 RX ORDER — CROMOLYN SODIUM 40 MG/ML
SOLUTION/ DROPS OPHTHALMIC
Qty: 10 ML | Refills: 11 | Status: SHIPPED | OUTPATIENT
Start: 2021-05-24

## 2021-06-09 DIAGNOSIS — M54.50 BILATERAL LOW BACK PAIN WITHOUT SCIATICA, UNSPECIFIED CHRONICITY: ICD-10-CM

## 2021-06-09 DIAGNOSIS — F41.1 GENERALIZED ANXIETY DISORDER: ICD-10-CM

## 2021-06-09 RX ORDER — GABAPENTIN 100 MG/1
CAPSULE ORAL
Qty: 60 CAPSULE | Refills: 0 | Status: SHIPPED | OUTPATIENT
Start: 2021-06-09 | End: 2021-07-13

## 2021-06-09 NOTE — TELEPHONE ENCOUNTER
Routing refill request to provider for review/approval because:  Drug not on the [facility] refill protocol

## 2021-06-14 ENCOUNTER — TELEPHONE (OUTPATIENT)
Dept: INTERNAL MEDICINE | Facility: CLINIC | Age: 36
End: 2021-06-14

## 2021-06-14 NOTE — TELEPHONE ENCOUNTER
Devendra faxed in a request for an alternative for cromolyn (OPTICROM) 4 % ophthalmic solution. It is not available.     Devendra phone [phone]

## 2021-06-15 ENCOUNTER — MYC MEDICAL ADVICE (OUTPATIENT)
Dept: INTERNAL MEDICINE | Facility: CLINIC | Age: 36
End: 2021-06-15

## 2021-06-15 NOTE — TELEPHONE ENCOUNTER
Patient Quality Outreach      Summary:    Patient has the following on her problem list/HM:     Asthma review       ACT Total Scores 9/11/2020   ACT TOTAL SCORE (Goal Greater than or Equal to 20) 22   In the past 12 months, how many times did you visit the emergency room for your asthma without being admitted to the hospital? 0   In the past 12 months, how many times were you hospitalized overnight because of your asthma? 0          Patient is due/failing the following:   ACT needed and Asthma follow-up visit and Cervical Cancer Screening - PAP Needed    Type of outreach:    Sent "[employer] message.    Questions for provider review:    None                                                                                                                                     Merari Chester CMA       Chart routed to .

## 2021-06-16 DIAGNOSIS — H10.45 CHRONIC ALLERGIC CONJUNCTIVITIS: ICD-10-CM

## 2021-06-16 RX ORDER — CROMOLYN SODIUM 40 MG/ML
SOLUTION/ DROPS OPHTHALMIC
Qty: 10 ML | Refills: 11 | Status: CANCELLED | OUTPATIENT
Start: 2021-06-16

## 2021-07-10 DIAGNOSIS — F41.1 GENERALIZED ANXIETY DISORDER: ICD-10-CM

## 2021-07-10 DIAGNOSIS — M54.50 BILATERAL LOW BACK PAIN WITHOUT SCIATICA, UNSPECIFIED CHRONICITY: ICD-10-CM

## 2021-07-13 RX ORDER — GABAPENTIN 100 MG/1
CAPSULE ORAL
Qty: 60 CAPSULE | Refills: 0 | Status: SHIPPED | OUTPATIENT
Start: 2021-07-13 | End: 2021-08-20

## 2021-07-13 NOTE — TELEPHONE ENCOUNTER
Disp Refills Start End GRACE   gabapentin (NEURONTIN) 100 MG capsule 60 capsule 0 6/9/2021  No   Sig: TAKE 1 CAPSULE(100 MG) BY MOUTH TWICE DAILY   Sent to pharmacy as: Gabapentin 100 MG Oral Capsule (NEURONTIN)   Class: E-Prescribe   Order: [id number]     Not RN protocol.    Last refill as above.    Refill as appropriate.    Anuradha Galdamez RN  [facility]

## 2021-08-20 ENCOUNTER — VIRTUAL VISIT (OUTPATIENT)
Dept: INTERNAL MEDICINE | Facility: CLINIC | Age: 36
End: 2021-08-20

## 2021-08-20 DIAGNOSIS — M54.50 BILATERAL LOW BACK PAIN WITHOUT SCIATICA, UNSPECIFIED CHRONICITY: ICD-10-CM

## 2021-08-20 DIAGNOSIS — F41.1 GENERALIZED ANXIETY DISORDER: Primary | ICD-10-CM

## 2021-08-20 PROCEDURE — 99214 OFFICE O/P EST MOD 30 MIN: CPT | Mod: 95 | Performed by: INTERNAL MEDICINE

## 2021-08-20 RX ORDER — GABAPENTIN 100 MG/1
CAPSULE ORAL
Qty: 180 CAPSULE | Refills: 1 | Status: SHIPPED | OUTPATIENT
Start: 2021-08-20 | End: 2022-03-31

## 2021-08-20 RX ORDER — VENLAFAXINE HYDROCHLORIDE 75 MG/1
75 CAPSULE, EXTENDED RELEASE ORAL DAILY
Qty: 30 CAPSULE | Refills: 1 | Status: SHIPPED | OUTPATIENT
Start: 2021-08-20 | End: 2022-06-10

## 2021-08-20 ASSESSMENT — PATIENT HEALTH QUESTIONNAIRE - PHQ9: SUM OF ALL RESPONSES TO PHQ QUESTIONS 1-9: 11

## 2021-08-20 NOTE — PROGRESS NOTES
Rachana is a 36 year old who is being evaluated via a billable telephone visit.      What phone number would you like to be contacted at? [phone]  How would you like to obtain your AVS? MyChart    Assessment & Plan     Generalized anxiety disorder  Did not tolerate BuSpar  Overall her symptoms are not adequately controlled and she would like to try an alternative.  She had side effects from many SSRIs and has not tolerated these.  She does not recall trying any SNRIs  Most try venlafaxine 75 mg follow-up in 1 month  - gabapentin (NEURONTIN) 100 MG capsule; TAKE 1 CAPSULE(100 MG) BY MOUTH TWICE DAILY  - venlafaxine (EFFEXOR-XR) 75 MG 24 hr capsule; Take 1 capsule (75 mg) by mouth daily    Bilateral low back pain without sciatica, unspecified chronicity  When she has insurance I think having her enrolled in a MedX program would be helpful until then continue the Neurontin at 100 twice daily as this seems to do pretty good job of helping both her anxiety and pain  - gabapentin (NEURONTIN) 100 MG capsule; TAKE 1 CAPSULE(100 MG) BY MOUTH TWICE DAILY             Tobacco Cessation:   reports that she has been smoking cigarettes. She has a 10.00 pack-year smoking history. She has never used smokeless tobacco.          No follow-ups on file.    Jesus Macias MD  [facility]   Rachana is a 36 year old who presents for the following health issues     HPI     Anxiety-still present feels the gabapentin helps but noticing moderate amount of breakthrough anxiety.  Did not tolerate the BuSpar and has not tolerated several SSRIs in the past    Pain-mostly low back pain.  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     Medication Followup of gabapentin    Taking Medication as prescribed: yes    Side Effects:  None    Medication Helping Symptoms:  yes         Review of Systems         Objective           Vitals:  No vitals were obtained today due to virtual  visit.    Physical Exam   alert and no distress  PSYCH: Alert and oriented times 3; coherent speech, normal   rate and volume, able to articulate logical thoughts, able   to abstract reason, no tangential thoughts, no hallucinations   or delusions  Her affect is normal  RESP: No cough, no audible wheezing, able to talk in full sentences  Remainder of exam unable to be completed due to telephone visits                Phone call duration: 20 minutes

## 2021-08-21 ASSESSMENT — ASTHMA QUESTIONNAIRES: ACT_TOTALSCORE: 20

## 2021-10-24 ENCOUNTER — HEALTH MAINTENANCE LETTER (OUTPATIENT)
Age: 36
End: 2021-10-24

## 2022-02-13 ENCOUNTER — HEALTH MAINTENANCE LETTER (OUTPATIENT)
Age: 37
End: 2022-02-13

## 2022-03-27 ENCOUNTER — MYC MEDICAL ADVICE (OUTPATIENT)
Dept: INTERNAL MEDICINE | Facility: CLINIC | Age: 37
End: 2022-03-27

## 2022-03-27 DIAGNOSIS — F41.1 GENERALIZED ANXIETY DISORDER: ICD-10-CM

## 2022-03-27 DIAGNOSIS — M54.50 BILATERAL LOW BACK PAIN WITHOUT SCIATICA, UNSPECIFIED CHRONICITY: ICD-10-CM

## 2022-03-28 NOTE — TELEPHONE ENCOUNTER
Routing refill request to provider for review/approval because:  Drug not on the [facility] refill protocol     Controlled Substance Refill Request forgabapentin (NEURONTIN) 100 MG capsule     Last Written Prescription Date:  8/20/21  Last Fill Quantity: 180,   # refills: 1      Last Office Visit with [facility] primary care provider: 8/20/21    Future Office visit:     Controlled substance agreement:   CSA -- Encounter Level:    CSA: None found at the encounter level.     CSA -- Patient Level:    CSA: None found at the patient level.         Last Urine Drug Screen: No results found for: CDAUT, No results found for: COMDAT, No results found for: THC13, PCP13, COC13, MAMP13, OPI13, AMP13, BZO13, TCA13, MTD13, BAR13, OXY13, PPX13, BUP13     Processing:  Rx to be electronically transmitted to pharmacy by provider     [email or web address]   checked in past 3 months?  Per provider.

## 2022-03-31 RX ORDER — GABAPENTIN 100 MG/1
100 CAPSULE ORAL 2 TIMES DAILY
Qty: 180 CAPSULE | Refills: 0 | Status: SHIPPED | OUTPATIENT
Start: 2022-03-31 | End: 2022-06-10

## 2022-03-31 NOTE — TELEPHONE ENCOUNTER
Dr. Macias is out of the office this week.  We partners are helping answer his messages and patient issues in his absence.      Prescription(s) sent electronically to specified pharmacy.

## 2022-04-06 ENCOUNTER — OFFICE VISIT (OUTPATIENT)
Dept: URGENT CARE | Facility: URGENT CARE | Age: 37
End: 2022-04-06

## 2022-04-06 ENCOUNTER — MYC MEDICAL ADVICE (OUTPATIENT)
Dept: INTERNAL MEDICINE | Facility: CLINIC | Age: 37
End: 2022-04-06

## 2022-04-06 ENCOUNTER — NURSE TRIAGE (OUTPATIENT)
Dept: INTERNAL MEDICINE | Facility: CLINIC | Age: 37
End: 2022-04-06

## 2022-04-06 VITALS
OXYGEN SATURATION: 100 % | RESPIRATION RATE: 16 BRPM | HEART RATE: 107 BPM | DIASTOLIC BLOOD PRESSURE: 77 MMHG | SYSTOLIC BLOOD PRESSURE: 120 MMHG | TEMPERATURE: 97.7 F

## 2022-04-06 DIAGNOSIS — J01.90 ACUTE SINUSITIS WITH SYMPTOMS > 10 DAYS: Primary | ICD-10-CM

## 2022-04-06 DIAGNOSIS — Z72.0 TOBACCO ABUSE: ICD-10-CM

## 2022-04-06 DIAGNOSIS — R59.1 LYMPHADENOPATHY: ICD-10-CM

## 2022-04-06 PROCEDURE — 99214 OFFICE O/P EST MOD 30 MIN: CPT | Performed by: FAMILY MEDICINE

## 2022-04-06 RX ORDER — AMOXICILLIN 875 MG
875 TABLET ORAL 2 TIMES DAILY
Qty: 20 TABLET | Refills: 0 | Status: SHIPPED | OUTPATIENT
Start: 2022-04-06 | End: 2022-04-16

## 2022-04-06 NOTE — TELEPHONE ENCOUNTER
"Pt called     C/o cough, congestion, can hear \"liquid\" in ears     Stuffy and phlegmy     2 COVID19 tests were negative     Also a \"lump in throat\" - that \"popped\"     Symptoms for 2.5 weeks     Cough is productive     Breathing? Is fine just trouble with phlegm stuck     Treatment? Tried Dayquil not helfpul     Hydration? Yes     Has asthma - has not needed inhaler     \"low grade\" fever - this weekend was 101 F otherwise has been 97-99F     Wheezing in the mornings     Per protocol, advised OV today. No openings at [facility] today, so discussed options of UC vs [facility]. Pt prefers to go to [facility] so she will plan to go to UC today     Luanne DODD, Triage RN  [facility]     Reason for Disposition    Wheezing is present    Additional Information    Negative: Bluish (or gray) lips or face    Negative: Severe difficulty breathing (e.g., struggling for each breath, speaks in single words)    Negative: Rapid onset of cough and has hives    Negative: Coughing started suddenly after medicine, an allergic food or bee sting    Negative: Difficulty breathing after exposure to flames, smoke, or fumes    Negative: Sounds like a life-threatening emergency to the triager    Negative: Previous asthma attacks and this feels like asthma attack    Negative: Chest pain present when not coughing    Negative: Difficulty breathing    Negative: Passed out (i.e., fainted, collapsed and was not responding)    Negative: Patient sounds very sick or weak to the triager    Negative: Increasing ankle swelling    Negative: Fever > 100.0 F (37.8 C) and bedridden (e.g., nursing home patient, stroke, chronic illness, recovering from surgery)    Negative: Fever > 100.0 F (37.8 C) and has diabetes mellitus or a weak immune system (e.g., HIV positive, cancer chemotherapy, organ transplant, splenectomy, chronic steroids)    Negative: Fever > 103 F (39.4 C)    Negative: Fever > 101 F (38.3 C) and over 60 years of age    Negative: " Coughed up > 1 tablespoon (15 ml) blood (Exception: blood-tinged sputum)    Protocols used: COUGH-A-OH

## 2022-04-06 NOTE — PROGRESS NOTES
SUBJECTIVE: Rachana Senior is a 36 year old female presenting with a chief complaint of nasal congestion, cough , facial pain/pressure and mass rt neck.  Onset of symptoms was 2 week(s) ago for uri.  Course of illness is same.    Current and Associated symptoms: cough - productive  Predisposing factors include tobacco use.    Past Medical History:   Diagnosis Date     Depressive disorder, not elsewhere classified 8/9/2006     GENERALIZED ANXIETY DIS 10/31/2006     Insufficient prenatal care 6/20/2006     Mild hyperemesis gravidarum, antepartum 6/27/2006     Seizures (H)      Tobacco use disorder 8/9/2006     Unspecified asthma(493.90)     Asthma     Allergies   Allergen Reactions     Seasonal Allergies      Social History     Tobacco Use     Smoking status: Current Every Day Smoker     Packs/day: 1.00     Years: 10.00     Pack years: 10.00     Types: Cigarettes     Smokeless tobacco: Never Used     Tobacco comment: 1 PPD   Substance Use Topics     Alcohol use: No       ROS:  SKIN: no rash  GI: no vomiting    OBJECTIVE:  /77   Pulse 107   Temp 97.7  F (36.5  C) (Tympanic)   Resp 16   SpO2 100% GENERAL APPEARANCE: healthy, alert and no distress  EYES: EOMI,  PERRL, conjunctiva clear  HENT: TM's normal bilaterally, rhinorrhea yellow, oral mucous membranes moist, no erythema noted and maxillary sinus tenderness   NECK: no adenopathy and cervical adenopathy left anterior  RESP: lungs clear to auscultation - no rales, rhonchi or wheezes  SKIN: no suspicious lesions or rashes      ICD-10-CM    1. Acute sinusitis with symptoms > 10 days  J01.90 amoxicillin (AMOXIL) 875 MG tablet     DISCONTINUED: amoxicillin-clavulanate (AUGMENTIN) 875-125 MG tablet   2. Lymphadenopathy  R59.1 Otolaryngology Referral   3. Tobacco abuse  Z72.0     quit tobacco  Fluids/Rest, f/u if worse/not any better

## 2022-04-07 ENCOUNTER — TELEPHONE (OUTPATIENT)
Dept: OTOLARYNGOLOGY | Facility: CLINIC | Age: 37
End: 2022-04-07

## 2022-04-07 NOTE — TELEPHONE ENCOUNTER
"Spoke with patient about scheduling appointment w/H&N provider. Patient declined and stated she would call back and schedule on her own due to self pay status. Provided ENT scheduling number for patient.    SCHEDULING INSTRUCTIONS: Please schedule patient for [facility] NEW appointment [place] [facility] [place] with Dr. Conroy, Dr. Preciado or Dr. Flores. Please include \"Lymphadenopathy [R59.1] referred by Manohar Cramer, DO in  URGENT CARE \" in appointment notes.     Thank you.  "

## 2022-04-19 ENCOUNTER — MYC MEDICAL ADVICE (OUTPATIENT)
Dept: INTERNAL MEDICINE | Facility: CLINIC | Age: 37
End: 2022-04-19

## 2022-04-19 NOTE — TELEPHONE ENCOUNTER
Patient Quality Outreach    Patient is due for the following:   Asthma  -  ACT needed  Cervical Cancer Screening - PAP Needed  Depression  -  PHQ-9 Needed    NEXT STEPS:   Schedule a yearly physical    Type of outreach:    Sent "[employer] message.      Questions for provider review:    None     Merari Chester, [facility]

## 2022-06-10 ENCOUNTER — OFFICE VISIT (OUTPATIENT)
Dept: INTERNAL MEDICINE | Facility: CLINIC | Age: 37
End: 2022-06-10

## 2022-06-10 VITALS
HEART RATE: 76 BPM | WEIGHT: 112 LBS | RESPIRATION RATE: 16 BRPM | BODY MASS INDEX: 18.08 KG/M2 | SYSTOLIC BLOOD PRESSURE: 122 MMHG | DIASTOLIC BLOOD PRESSURE: 80 MMHG | OXYGEN SATURATION: 99 % | TEMPERATURE: 97.5 F

## 2022-06-10 DIAGNOSIS — M54.50 BILATERAL LOW BACK PAIN WITHOUT SCIATICA, UNSPECIFIED CHRONICITY: ICD-10-CM

## 2022-06-10 DIAGNOSIS — M25.512 ACUTE PAIN OF LEFT SHOULDER: Primary | ICD-10-CM

## 2022-06-10 DIAGNOSIS — F41.1 GENERALIZED ANXIETY DISORDER: ICD-10-CM

## 2022-06-10 PROCEDURE — 99213 OFFICE O/P EST LOW 20 MIN: CPT | Performed by: PHYSICIAN ASSISTANT

## 2022-06-10 RX ORDER — PREDNISONE 20 MG/1
20 TABLET ORAL DAILY
Qty: 7 TABLET | Refills: 0 | Status: SHIPPED | OUTPATIENT
Start: 2022-06-10 | End: 2022-06-17

## 2022-06-10 RX ORDER — GABAPENTIN 100 MG/1
100 CAPSULE ORAL 2 TIMES DAILY
Qty: 180 CAPSULE | Refills: 0 | Status: SHIPPED | OUTPATIENT
Start: 2022-06-10 | End: 2022-09-16

## 2022-06-10 ASSESSMENT — PATIENT HEALTH QUESTIONNAIRE - PHQ9
SUM OF ALL RESPONSES TO PHQ QUESTIONS 1-9: 10
10. IF YOU CHECKED OFF ANY PROBLEMS, HOW DIFFICULT HAVE THESE PROBLEMS MADE IT FOR YOU TO DO YOUR WORK, TAKE CARE OF THINGS AT HOME, OR GET ALONG WITH OTHER PEOPLE: SOMEWHAT DIFFICULT
SUM OF ALL RESPONSES TO PHQ QUESTIONS 1-9: 10

## 2022-06-10 NOTE — PROGRESS NOTES
Assessment & Plan     Acute pain of left shoulder  Reviewed treatment  Light duty for work for the next 10 days   Rest as able   If not improving then f/u with PCP -   - predniSONE (DELTASONE) 20 MG tablet; Take 1 tablet (20 mg) by mouth daily for 7 days    Bilateral low back pain without sciatica, unspecified chronicity  Refill done  Short term needs to see PCP   - gabapentin (NEURONTIN) 100 MG capsule; Take 1 capsule (100 mg) by mouth 2 times daily    Generalized anxiety disorder    - gabapentin (NEURONTIN) 100 MG capsule; Take 1 capsule (100 mg) by mouth 2 times daily             Tobacco Cessation:   reports that she has been smoking cigarettes. She has a 10.00 pack-year smoking history. She has never used smokeless tobacco.  Tobacco Cessation Action Plan: per PCP        Return in about 4 weeks (around 7/8/2022) for regular primary provider.    Marce Huggins PA-C  [facility] Campus[facility]RISTIN Reich is a 36 year old who presents for the following health issues     History of Present Illness       Reason for visit:  Meds and shoulder    She eats 0-1 servings of fruits and vegetables daily.She consumes 2 sweetened beverage(s) daily.She exercises with enough effort to increase her heart rate 9 or less minutes per day.  She exercises with enough effort to increase her heart rate 3 or less days per week.   She is taking medications regularly.    Today's PHQ-9         PHQ-9 Total Score: 10    PHQ-9 Q9 Thoughts of better off dead/self-harm past 2 weeks :   Not at all    How difficult have these problems made it for you to do your work, take care of things at home, or get along with other people: Somewhat difficult       Musculoskeletal problem/pain      Duration: left shoulder- x 1 week    Description  Location: left shoulder    Intensity:  mild, moderate    Accompanying signs and symptoms: some pain into the arm at times.         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      Objective    /80   Pulse 76   Temp 97.5  F (36.4  C) (Tympanic)   Resp 16   Wt 50.8 kg (112 lb)   SpO2 99%   BMI 18.08 kg/m    Body mass index is 18.08 kg/m .  Physical Exam   GENERAL: healthy, alert and no distress  RESP: lungs clear to auscultation - no rales, rhonchi or wheezes  CV: regular rates and rhythm and normal S1 S2, no S3 or S4  MS: tenderness posterior and anterior shoulder  ROM of the shoulder full, though some pain with overhead    SKIN: no suspicious lesions or rashes

## 2022-06-10 NOTE — LETTER
Rebecca 10, 2022      Rachana Senior  [address] [facility] [address]        To Whom It May Concern:    Rachana Senior was seen in our clinic. She may return to work with the following: limited to light duty - lifting no greater than 10 pounds, no repetitvie use of arms over shoulders and no repetitive motions. These restrictions are for the next 10 days.       Sincerely,        Marce Huggins PA-C

## 2022-07-15 ENCOUNTER — APPOINTMENT (OUTPATIENT)
Dept: GENERAL RADIOLOGY | Facility: CLINIC | Age: 37
End: 2022-07-15
Attending: PHYSICIAN ASSISTANT

## 2022-07-15 ENCOUNTER — HOSPITAL ENCOUNTER (EMERGENCY)
Facility: CLINIC | Age: 37
Discharge: HOME | End: 2022-07-15
Attending: PHYSICIAN ASSISTANT | Admitting: PHYSICIAN ASSISTANT

## 2022-07-15 VITALS
DIASTOLIC BLOOD PRESSURE: 50 MMHG | SYSTOLIC BLOOD PRESSURE: 112 MMHG | TEMPERATURE: 99.6 F | RESPIRATION RATE: 18 BRPM | HEART RATE: 79 BPM | OXYGEN SATURATION: 97 %

## 2022-07-15 DIAGNOSIS — U07.1 INFECTION DUE TO 2019 NOVEL CORONAVIRUS: ICD-10-CM

## 2022-07-15 LAB
DEPRECATED S PYO AG THROAT QL EIA: NEGATIVE
FLUAV RNA SPEC QL NAA+PROBE: NEGATIVE
FLUBV RNA RESP QL NAA+PROBE: NEGATIVE
GROUP A STREP BY PCR: NOT DETECTED
RSV RNA SPEC NAA+PROBE: NEGATIVE
SARS-COV-2 RNA RESP QL NAA+PROBE: POSITIVE

## 2022-07-15 PROCEDURE — C9803 HOPD COVID-19 SPEC COLLECT: HCPCS

## 2022-07-15 PROCEDURE — 87637 SARSCOV2&INF A&B&RSV AMP PRB: CPT | Performed by: PHYSICIAN ASSISTANT

## 2022-07-15 PROCEDURE — 99285 EMERGENCY DEPT VISIT HI MDM: CPT | Mod: CS,25

## 2022-07-15 PROCEDURE — 93005 ELECTROCARDIOGRAM TRACING: CPT

## 2022-07-15 PROCEDURE — 87637 SARSCOV2&INF A&B&RSV AMP PRB: CPT | Performed by: EMERGENCY MEDICINE

## 2022-07-15 PROCEDURE — 71046 X-RAY EXAM CHEST 2 VIEWS: CPT

## 2022-07-15 PROCEDURE — 87651 STREP A DNA AMP PROBE: CPT | Performed by: PHYSICIAN ASSISTANT

## 2022-07-15 ASSESSMENT — ENCOUNTER SYMPTOMS
MYALGIAS: 1
COUGH: 1
BACK PAIN: 1
FEVER: 1
HEADACHES: 1
SORE THROAT: 1
CHEST TIGHTNESS: 1
FATIGUE: 1

## 2022-07-15 NOTE — ED TRIAGE NOTES
Pt states that she feels fatigued, having body aches and headaches. This morning pt started having chest and throat pain. Pt has been sick for a total of 2 days. ABC intact. Pt had 2 negative covid tests yesterday.

## 2022-07-15 NOTE — ED PROVIDER NOTES
History   Chief Complaint:  Cold symptoms    HPI   Rachana Senior is a 36 year old female with history of epilepsy, asthma, and lumbosacral ligament sprain who presents with cold symptoms. The patient states she developed back pain, myalgias, fatigue, fever and a headache yesterday. This morning she woke up with chest discomfort, cough and a sore throat. She did have 2 negative Covid tests yesterday. She has had a decrease in taste and smell. She has been taking Tylenol which improved fever. Denies falls or trauma. She is not vaccinated against Covid-19.     Review of Systems   Constitutional: Positive for fatigue and fever.   HENT: Positive for sore throat.    Respiratory: Positive for cough and chest tightness.    Musculoskeletal: Positive for back pain and myalgias.   Neurological: Positive for headaches.   All other systems reviewed and are negative.      Allergies:  No Known Drug Allergies    Medications:  Neurontin  Albuterol inhaler  Flonase    Past Medical History:     Lumbosacral ligament sprain  Generalized anxiety disorder   Asthma   Severe methamphetamine dependence   Major depressive disorder  Epilepsy    Past Surgical History:    Tonsillectomy and adenoidectomy   Tubal ligation    Family History:    Mother- colorectal cancer  Father- heart disease, seizures    Social History:  The patient presents to the ED alone  PCP: Shadia Hastings PA    Physical Exam     Patient Vitals for the past 24 hrs:   BP Temp Temp src Pulse Resp SpO2   07/15/22 1159 (!) 113/97 99.6  F (37.6  C) Oral 98 16 98 %       Physical Exam  Constitutional: Pleasant. Cooperative.   Eyes: Pupils equally round and reactive  HENT: Head is normal in appearance. Oropharynx is normal with moist mucus membranes.  Cardiovascular: Regular rate and rhythm and without murmurs.  Respiratory: Normal respiratory effort, lungs are clear bilaterally.  Musculoskeletal: No asymmetry of the lower extremities, no tenderness to palpation.   Skin:  Normal, without rash.  Neurologic: Cranial nerves grossly intact, normal cognition, no focal deficits. Alert and oriented x 3.   Psychiatric: Normal affect.  Nursing notes and vital signs reviewed.    Emergency Department Course   ECG  ECG taken at 1211, ECG read at 1540  Normal sinus rhythm. Rightward axis. Incomplete right bundle branch block.    No prior EKG for comparison.  Rate 68 bpm. TN interval 128 ms. QRS duration 94 ms. QT/QTc 392/416 ms. P-R-T axes 61 99 59.     Imaging:  XR Chest 2 Views   Final Result   IMPRESSION: No acute cardiopulmonary disease.      MUKUND PRICE MD            SYSTEM ID:  [id number]        Report per radiology    Laboratory:  Labs Ordered and Resulted from Time of ED Arrival to Time of ED Departure   INFLUENZA A/B & SARS-COV2 PCR MULTIPLEX - Abnormal       Result Value    Influenza A PCR Negative      Influenza B PCR Negative      RSV PCR Negative      SARS CoV2 PCR Positive (*)    STREPTOCOCCUS A RAPID SCREEN W REFELX TO PCR - Normal    Group A Strep antigen Negative     GROUP A STREPTOCOCCUS PCR THROAT SWAB     Emergency Department Course:       Reviewed:  I reviewed nursing notes, vitals, past medical history and Care Everywhere    Assessments:  1527 I obtained history and examined the patient as noted above.   1620 I rechecked the patient and explained findings.     Disposition:  The patient was discharged to home.     Impression & Plan     Medical Decision Making:  Rachana Senior is a 36 year old female who presents to the ED for evaluation of pharyngitis, fever, chest tightness, generalized myalgias, among others. Patient developed cough today as well as decreased sensation of smell and taste. See HPI as above for additional details. Vitals and physical exam as above. COVID swab returns positive. Suspect this as etiology of patient's symptoms. ECG without concerning ST changes at this time. CXR negative for acute abnormality. No evidence for PTA, RPA, epiglottitis. Low  suspicion for ACS otherwise at this time. No urinary symptoms to suggest for UTI/pyleo. Discussed Paxlovid, patient prefers to defer at this time. Advised conservative management otherwise. Evans patient was safe for discharge to home with reassuring vital signs. Discussed reasons to return. All questions answered. Patient discharged to home in stable condition.       Diagnosis:    ICD-10-CM    1. Infection due to 2019 novel coronavirus  U07.1        Discharge Medications:  New Prescriptions    No medications on file       Scribe Disclosure:  I, Suni Silver, am serving as a scribe at 3:24 PM on 7/15/2022 to document services personally performed by Jaiden Erickson PA-C based on my observations and the provider's statements to me.     This record was created at least in part using electronic voice recognition software, so please excuse any typographical errors.         Jaiden Erickson PA-C  07/15/22 [id number]

## 2022-07-15 NOTE — DISCHARGE INSTRUCTIONS

## 2022-07-18 ENCOUNTER — MYC MEDICAL ADVICE (OUTPATIENT)
Dept: INTERNAL MEDICINE | Facility: CLINIC | Age: 37
End: 2022-07-18

## 2022-07-18 LAB
ATRIAL RATE - MUSE: 68 BPM
DIASTOLIC BLOOD PRESSURE - MUSE: NORMAL MMHG
INTERPRETATION ECG - MUSE: NORMAL
P AXIS - MUSE: 61 DEGREES
PR INTERVAL - MUSE: 128 MS
QRS DURATION - MUSE: 94 MS
QT - MUSE: 392 MS
QTC - MUSE: 416 MS
R AXIS - MUSE: 99 DEGREES
SYSTOLIC BLOOD PRESSURE - MUSE: NORMAL MMHG
T AXIS - MUSE: 59 DEGREES
VENTRICULAR RATE- MUSE: 68 BPM

## 2022-09-14 ENCOUNTER — NURSE TRIAGE (OUTPATIENT)
Dept: INTERNAL MEDICINE | Facility: CLINIC | Age: 37
End: 2022-09-14

## 2022-09-14 ENCOUNTER — OFFICE VISIT (OUTPATIENT)
Dept: URGENT CARE | Facility: URGENT CARE | Age: 37
End: 2022-09-14

## 2022-09-14 VITALS
OXYGEN SATURATION: 98 % | TEMPERATURE: 98.2 F | WEIGHT: 112 LBS | HEART RATE: 105 BPM | SYSTOLIC BLOOD PRESSURE: 124 MMHG | DIASTOLIC BLOOD PRESSURE: 87 MMHG | BODY MASS INDEX: 18.08 KG/M2 | RESPIRATION RATE: 18 BRPM

## 2022-09-14 DIAGNOSIS — N64.52 BILATERAL NIPPLE DISCHARGE: Primary | ICD-10-CM

## 2022-09-14 LAB — PROLACTIN SERPL 3RD IS-MCNC: 4 NG/ML (ref 5–23)

## 2022-09-14 PROCEDURE — 36415 COLL VENOUS BLD VENIPUNCTURE: CPT | Performed by: PHYSICIAN ASSISTANT

## 2022-09-14 PROCEDURE — 99214 OFFICE O/P EST MOD 30 MIN: CPT | Performed by: PHYSICIAN ASSISTANT

## 2022-09-14 PROCEDURE — 84146 ASSAY OF PROLACTIN: CPT | Performed by: PHYSICIAN ASSISTANT

## 2022-09-14 NOTE — TELEPHONE ENCOUNTER
Called and relayed Dr. Macias's message to pt. Pt stated she would see a gyn a pap/breast exam. Offered to transfer pt to central scheduling so they can assist pt with scheduling pt with a GYN. Pt asked that the central scheduling # be sent to her via [employer] so she can call them in about a half an hour.   MC message sent with central scheduling #.

## 2022-09-14 NOTE — TELEPHONE ENCOUNTER
Does she have gyn?  Way overdue for exam/pap etc  Schedule her for annual- either w/me (if ok for me to do pap) or with gyn.     With bilat symptoms - less concerned about breast ca but I'd recommend someone examine her and then decide best approach

## 2022-09-14 NOTE — PATIENT INSTRUCTIONS
"Fibrocystic breasts are composed of tissue that feels lumpy or ropelike in texture. Doctors call this nodular or glandular breast tissue.  It's not at all uncommon to have fibrocystic breasts or experience fibrocystic breast changes. In fact, medical professionals have stopped using the term \"fibrocystic breast disease\" and now simply refer to \"fibrocystic breasts\" or \"fibrocystic breast changes\" because having fibrocystic breasts isn't a disease. Breast changes that fluctuate with the menstrual cycle and have a ropelike texture are considered normal.    Fibrocystic breast changes don't always cause symptoms. Some people experience breast pain, tenderness and lumpiness Breast symptoms tend to be most bothersome just before menstruation and get better afterward. Simple self-care measures can usually relieve discomfort associated with fibrocystic breasts. For pain control Tylenol or Ibuprofen may be used along with warm compress to the breast.     Follow up with primary care if you have continued lumpiness over the next 2 menstrual cycles. Follow up if you develop skin changes such as redness, pealing, or puckering of the skin. Follow up if have a larger or immobile breast mass.     You will be notified of your prolactin level via mychart.     Avoid nipple stimulation. Wear supportive bra when doing activities.   "

## 2022-09-14 NOTE — TELEPHONE ENCOUNTER
"CC: Patient calling reporting breast symptoms - requesting an order for a mammogram. States she called the breast center who instructed her to reach out to PCP     SYMPTOMS: pain (\"tenderness\") lumps, nipple discharge  LOCATION: bilateral   ONSET: months ago   PRIOR HISTORY: 10 years ago patient had a breast cyst  CAUSE: \"I am really worried about cancer\"  OTHER SYMPTOMS: denies fever, nipples are red. Denies rash. States she has noticed some nipple discharge  PREGNANCY-BREASTFEEDING: denies, states she has had a tubal ligation years ago     Triaged per [facility] protocol, patient to be seen in office within 3 days. Patient would like to know if she needs to see PCP or if she can go and directly get a mammogram - Please advise     Callback [phone] - ok to leave detailed VM     Lakeisha Santana RN  [facility]      Reason for Disposition    Breast lump    Additional Information    Negative: Chest pain    Negative: Breastfeeding questions about baby    Negative: Breastfeeding questions about mother (breast symptoms or feeling sick)    Negative: Breastfeeding questions about mother's medicines and diet    Negative: Postpartum breast pain and swelling, not breastfeeding    Negative: Small spot, skin growth or mole    Negative: SEVERE breast pain and fever > 103 F  (39.4 C)    Negative: Patient sounds very sick or weak to the triager    Negative: Breast looks infected (spreading redness, feels hot or painful to touch) and fever    Negative: Breast looks infected (spreading redness, feels hot or painful to touch) and no fever    Negative: Painful rash and multiple small blisters grouped together (i.e., dermatomal distribution or \"band\" or \"stripe\")    Negative: Cuts, burns, or bruises of breasts and suspicious history for the injury    Protocols used: BREAST SYMPTOMS-A-OH      "

## 2022-09-14 NOTE — PROGRESS NOTES
"Patient presents with:  Urgent Care: Bilateral breast discharge       Clinical Decision Making: Patient concerned by bilateral breast discharge.  No discharge present on exam today.  Prolactin level is low and not concerning for prolactinoma.  No breast findings concerning for malignancy.  Patient did have fibrocystic breasts, which I reassured her are normal.  Patient will follow-up if breast discharge persists.  Encouraged her to avoid any breast stimulation, chaffing, or rubbing.       ICD-10-CM    1. Bilateral nipple discharge  N64.52 Prolactin     Prolactin       Patient Instructions   Fibrocystic breasts are composed of tissue that feels lumpy or ropelike in texture. Doctors call this nodular or glandular breast tissue.  It's not at all uncommon to have fibrocystic breasts or experience fibrocystic breast changes. In fact, medical professionals have stopped using the term \"fibrocystic breast disease\" and now simply refer to \"fibrocystic breasts\" or \"fibrocystic breast changes\" because having fibrocystic breasts isn't a disease. Breast changes that fluctuate with the menstrual cycle and have a ropelike texture are considered normal.    Fibrocystic breast changes don't always cause symptoms. Some people experience breast pain, tenderness and lumpiness Breast symptoms tend to be most bothersome just before menstruation and get better afterward. Simple self-care measures can usually relieve discomfort associated with fibrocystic breasts. For pain control Tylenol or Ibuprofen may be used along with warm compress to the breast.     Follow up with primary care if you have continued lumpiness over the next 2 menstrual cycles. Follow up if you develop skin changes such as redness, pealing, or puckering of the skin. Follow up if have a larger or immobile breast mass.     You will be notified of your prolactin level via mychart.     Avoid nipple stimulation. Wear supportive bra when doing activities.       HPI:  Rachana " TROY Senior is a 37 year old female who presents today complaining of white bilateral and grey milky discharge x 2 days. Breasts are sensitive. She has her tubes tied. No breast stimulation.     History obtained from the patient.    Problem List:  2017-12: Severe methamphetamine dependence in early remission (H)  2017-09: Chronic allergic rhinitis due to other allergic trigger,   unspecified seasonality  2017-09: Chronic allergic conjunctivitis  2017-04: Atopic dermatitis, unspecified type  2017-04: Bilateral low back pain without sciatica, unspecified   chronicity  2017-04: Seasonal allergic rhinitis, unspecified allergic rhinitis   trigger  2017-04: Mild persistent asthma without complication  2006-10: GENERALIZED ANXIETY DIS  2006-08: Tobacco use disorder  2006-06: Lumbosacral ligament sprain      Past Medical History:   Diagnosis Date     Depressive disorder, not elsewhere classified 8/9/2006     GENERALIZED ANXIETY DIS 10/31/2006     Insufficient prenatal care 6/20/2006     Mild hyperemesis gravidarum, antepartum 6/27/2006     Seizures (H)      Tobacco use disorder 8/9/2006     Unspecified asthma(493.90)     Asthma       Social History     Tobacco Use     Smoking status: Current Every Day Smoker     Packs/day: 1.00     Years: 10.00     Pack years: 10.00     Types: Cigarettes     Smokeless tobacco: Never Used     Tobacco comment: 1 PPD   Substance Use Topics     Alcohol use: No       Review of Systems   Genitourinary:        Bilateral white nipple discharge   All other systems reviewed and are negative.      Vitals:    09/14/22 1327   BP: 124/87   Pulse: 105   Resp: 18   Temp: 98.2  F (36.8  C)   SpO2: 98%   Weight: 50.8 kg (112 lb)       Physical Exam  Vitals and nursing note reviewed.   Constitutional:       General: She is not in acute distress.     Appearance: She is not toxic-appearing or diaphoretic.   HENT:      Head: Normocephalic and atraumatic.      Right Ear: External ear normal.      Left Ear: External  ear normal.   Eyes:      Conjunctiva/sclera: Conjunctivae normal.   Pulmonary:      Effort: Pulmonary effort is normal. No respiratory distress.   Chest:      Comments: Small ropelike mobile bumps present in the right and left breast.  Patient is very thin.  I am able to palpate 2 small lymph nodes 1 in each axilla.  They are smaller than a pea and are considered normal.  No discharge noted on expression.  No skin changes or nipple inversion.  Neurological:      Mental Status: She is alert.   Psychiatric:         Mood and Affect: Mood normal.         Behavior: Behavior normal.         Thought Content: Thought content normal.         Judgment: Judgment normal.         Results:  Results for orders placed or performed in visit on 09/14/22   Prolactin     Status: Abnormal   Result Value Ref Range    Prolactin 4 (L) 5 - 23 ng/mL         At the end of the encounter, I discussed results, diagnosis, medications. Discussed red flags for immediate return to clinic/ER, as well as indications for follow up if no improvement. Patient understood and agreed to plan. Patient was stable for discharge.

## 2022-09-16 DIAGNOSIS — M54.50 BILATERAL LOW BACK PAIN WITHOUT SCIATICA, UNSPECIFIED CHRONICITY: ICD-10-CM

## 2022-09-16 DIAGNOSIS — F41.1 GENERALIZED ANXIETY DISORDER: ICD-10-CM

## 2022-09-16 RX ORDER — GABAPENTIN 100 MG/1
CAPSULE ORAL
Qty: 180 CAPSULE | Refills: 0 | Status: SHIPPED | OUTPATIENT
Start: 2022-09-16 | End: 2022-12-20

## 2022-10-15 ENCOUNTER — HEALTH MAINTENANCE LETTER (OUTPATIENT)
Age: 37
End: 2022-10-15

## 2022-11-11 ENCOUNTER — VIRTUAL VISIT (OUTPATIENT)
Dept: INTERNAL MEDICINE | Facility: CLINIC | Age: 37
End: 2022-11-11

## 2022-11-11 DIAGNOSIS — F41.1 GENERALIZED ANXIETY DISORDER: Primary | ICD-10-CM

## 2022-11-11 PROCEDURE — 99214 OFFICE O/P EST MOD 30 MIN: CPT | Mod: GT | Performed by: INTERNAL MEDICINE

## 2022-11-11 RX ORDER — MIRTAZAPINE 15 MG/1
TABLET, FILM COATED ORAL
Qty: 45 TABLET | Refills: 1 | Status: SHIPPED | OUTPATIENT
Start: 2022-11-11 | End: 2023-03-02

## 2022-11-11 NOTE — PROGRESS NOTES
Rachana is a 37 year old who is being evaluated via a billable telephone visit.      What phone number would you like to be contacted at? [phone]  How would you like to obtain your AVS? MyChart    Assessment & Plan     Generalized anxiety disorder  Try mirtazapine - both for anxiiety/depression, weight gain and appetite stimulant  Cont gabapentin   - mirtazapine (REMERON) 15 MG tablet; Take 1 tablet (15 mg) by mouth At Bedtime for 14 days, THEN 2 tablets (30 mg) At Bedtime. (if symptoms have improved on 15 mg dose may stay at that dose as well)    Prescription drug management         See Patient Instructions    No follow-ups on file.    Jesus Macias MD  [facility]    Subjective   Rachana is a 37 year old, presenting for the following health issues:  No chief complaint on file.      HPI     Medication Followup of gabapentin    Taking Medication as prescribed: yes    Side Effects:  None    Medication Helping Symptoms:  Barely feeles like it is helping        Review of Systems         Objective           Vitals:  No vitals were obtained today due to virtual visit.    Physical Exam   alert and no distress  PSYCH: Alert and oriented times 3; coherent speech, normal   rate and volume, able to articulate logical thoughts, able   to abstract reason, no tangential thoughts, no hallucinations   or delusions  Her affect is normal  RESP: No cough, no audible wheezing, able to talk in full sentences  Remainder of exam unable to be completed due to telephone visits                Phone call duration: 22 minutes

## 2022-11-21 ENCOUNTER — TELEPHONE (OUTPATIENT)
Dept: INTERNAL MEDICINE | Facility: CLINIC | Age: 37
End: 2022-11-21

## 2022-11-21 NOTE — TELEPHONE ENCOUNTER
Ne RN, Oral Surgeon office - [employer], [phone]     Ne called requesting PCP review/recommend pain management for pt who had oral surgery 11/17/22.   Pt had 12 teeth removed and 10 implants placed.   Pt is taking ibuprofen/tylenol and gabapentin as prescribed. Pt has told Ne that she is still having pain 8/10, wondering if PCP would approve increasing gabapentin temporarily to promote healing?  Ne said that pt has history of drug abuse, so would like to avoid narcotics.   Ne said the patient is not eating due to pain.

## 2022-11-21 NOTE — TELEPHONE ENCOUNTER
Spoke to Ne RN with Oral Surgeon's office, to relay providers recommendations.   Ne will fax patient's pain medication regime after speaking with patient and coming to a resolution.

## 2022-11-21 NOTE — TELEPHONE ENCOUNTER
Patient Contact    Attempt # 1    Was call answered?  Yes. Ne RN is out for lunch, will call back in 1 hour.

## 2022-11-21 NOTE — TELEPHONE ENCOUNTER
Not sure inc gabapentin is going to be that much more effective here- can try 300 mg TID.  May want to change to a more potent nsaid like diclofenac or celebrex. . At minimum - would dose ibuprofen at 600-800 mg q8h  Would they try some oral lidocaine applied topically to extraction sites?  That would per oral surgery but the above and some thoughts if trying to avoid narcotic use- which I'd recommend staying away from as well.

## 2022-12-18 DIAGNOSIS — M54.50 BILATERAL LOW BACK PAIN WITHOUT SCIATICA, UNSPECIFIED CHRONICITY: ICD-10-CM

## 2022-12-18 DIAGNOSIS — F41.1 GENERALIZED ANXIETY DISORDER: ICD-10-CM

## 2022-12-20 RX ORDER — GABAPENTIN 100 MG/1
CAPSULE ORAL
Qty: 180 CAPSULE | Refills: 0 | Status: SHIPPED | OUTPATIENT
Start: 2022-12-20 | End: 2023-04-05

## 2023-02-12 ENCOUNTER — MYC MEDICAL ADVICE (OUTPATIENT)
Dept: INTERNAL MEDICINE | Facility: CLINIC | Age: 38
End: 2023-02-12

## 2023-02-14 NOTE — TELEPHONE ENCOUNTER
Attempted to contact pt to discuss [facility] message from 2/12/23. No answer. Left VM.     Upon call back please discuss what is needed to be talked about, the on going concern pt stated she has and her current med/future med concern from [facility] message from 2/12/23.       Patient Contact    Attempt # 1    Was call answered?  No.  Left message on voicemail with information to call me back.

## 2023-02-15 NOTE — TELEPHONE ENCOUNTER
"Called and spoke to the patient regarding [employer] message.     Pt would like to know if provider recommends a virtual visit or an in person visit. Please review pt concerns and advise if okay to see pt sooner than next available.     Anxiety/inattention: pt report was diagnosed with ADD as a child. Current anxiety medications have helped her think more clearly. Experiencing sensation of feeling \"paralyzized\" where it is difficult to move or do something and has been overthinking every step. Pt does not have records of previous testing. Open to being tested again. Would like to discuss medication and treatment options.     \"spells\": for last 6 months pt has been getting what she calls spells. Feels dull chest pain (before or after) mid breast area closer to L side. Feels nauseous, heart races, gets tunnel vision and feel like going to faint. No trigger es identified. Occur when sitting/walking/standing. occur ~2 times per week.  No sx during call. Happening for the last 6 months getting more frequent. Once had her BP checked during a spell, was told BP was high. Cannot recall value.     Routing to provider for visit recommendations.    Aneudy Munroe RN    "

## 2023-02-15 NOTE — TELEPHONE ENCOUNTER
Virtual appt ok  Can refer to psychology for testing for add if warranted. Stimulants not an option for her- but non-stimulants might be.   Other sx seem like anxiety

## 2023-02-16 ENCOUNTER — NURSE TRIAGE (OUTPATIENT)
Dept: INTERNAL MEDICINE | Facility: CLINIC | Age: 38
End: 2023-02-16

## 2023-02-16 NOTE — TELEPHONE ENCOUNTER
"OV scheduled. Pt will contact clinic or seek immediate medical attention at /ER for red flag symptoms; chest pain/shortness of breath, or contact clinic with any questions/concerns prior to appointment.       1. CONCERN: \"Did anything happen that prompted you to call today?\"       Chest discomfort, lightheaded feeling several days ago 2/12.  2. ANXIETY SYMPTOMS: \"Can you describe how you (your loved one; patient) have been feeling?\" (e.g., tense, restless, panicky, anxious, keyed up, overwhelmed, sense of impending doom).       Chest discomfort, lightheaded, racing heart  3. ONSET: \"How long have you been feeling this way?\" (e.g., hours, days, weeks)      Has been having \"spells\" atleast 6 months  4. SEVERITY: \"How would you rate the level of anxiety?\" (e.g., 0 - 10; or mild, moderate, severe).      Mild most of the time  5. FUNCTIONAL IMPAIRMENT: \"How have these feelings affected your ability to do daily activities?\" \"Have you had more difficulty than usual doing your normal daily activities?\" (e.g., getting better, same, worse; self-care, school, work, interactions)      During the spells; lay down for max 30 min.   6. HISTORY: \"Have you felt this way before?\" \"Have you ever been diagnosed with an anxiety problem in the past?\" (e.g., generalized anxiety disorder, panic attacks, PTSD). If Yes, ask: \"How was this problem treated?\" (e.g., medicines, counseling, etc.)      Anxiety, ADHD, PTSD, panic attack?  7. RISK OF HARM - SUICIDAL IDEATION: \"Do you ever have thoughts of hurting or killing yourself?\" If Yes, ask:  \"Do you have these feelings now?\" \"Do you have a plan on how you would do this?\"      denies  8. TREATMENT:  \"What has been done so far to treat this anxiety?\" (e.g., medicines, relaxation strategies). \"What has helped?\"      Medications; Remeron.   9. TREATMENT - THERAPIST: \"Do you have a counselor or therapist? Name?\"      Not yet  10. POTENTIAL TRIGGERS: \"Do you drink caffeinated beverages (e.g., " "coffee, mine, teas), and how much daily?\" \"Do you drink alcohol or use any drugs?\" \"Have you started any new medicines recently?\"      denies  10. PATIENT SUPPORT: \"Who is with you now?\" \"Who do you live with?\" \"Do you have family or friends who you can talk to?\"         Family/friends  11. OTHER SYMPTOMS: \"Do you have any other symptoms?\" (e.g., feeling depressed, trouble concentrating, trouble sleeping, trouble breathing, palpitations or fast heartbeat, chest pain, sweating, nausea, or diarrhea)        Nausea during spells, dizzy  12. PREGNANCY: \"Is there any chance you are pregnant?\" \"When was your last menstrual period?\"         Denies, LMP: ~1/16/2023     Reason for Disposition    Symptoms interfere with work or school    Additional Information    Negative: SEVERE difficulty breathing (e.g., struggling for each breath, speaks in single words)    Negative: Bluish (or gray) lips or face    Negative: Difficult to awaken or acting confused (e.g., disoriented, slurred speech)    Negative: Hysterical or combative behavior    Negative: Sounds like a life-threatening emergency to the triager    Negative: Chest pain    Negative: Palpitations, skipped heart beat, or rapid heart beat    Negative: Cough is main symptom    Negative: Suicide thoughts, threats, attempts, or questions    Negative: Depression is main problem or symptom (e.g., feelings of sadness or hopelessness)    Negative: Difficulty breathing and persists > 10 minutes and not relieved by reassurance provided by triager    Negative: Lightheadedness or dizziness and persists > 10 minutes and not relieved by reassurance provided by triager    Negative: Substance use (drug use) or unhealthy alcohol use, known or suspected, and feeling very shaky (i.e., visible tremors of hands)    Negative: Patient sounds very sick or weak to the triager    Negative: Patient sounds very upset or troubled to the triager    Protocols used: ANXIETY AND PANIC ATTACK-A-OH      "

## 2023-03-02 ENCOUNTER — OFFICE VISIT (OUTPATIENT)
Dept: INTERNAL MEDICINE | Facility: CLINIC | Age: 38
End: 2023-03-02

## 2023-03-02 VITALS
HEART RATE: 73 BPM | HEIGHT: 66 IN | TEMPERATURE: 98.9 F | DIASTOLIC BLOOD PRESSURE: 83 MMHG | OXYGEN SATURATION: 97 % | SYSTOLIC BLOOD PRESSURE: 123 MMHG | RESPIRATION RATE: 20 BRPM | BODY MASS INDEX: 19.35 KG/M2 | WEIGHT: 120.4 LBS

## 2023-03-02 DIAGNOSIS — F41.1 GENERALIZED ANXIETY DISORDER: ICD-10-CM

## 2023-03-02 DIAGNOSIS — R22.1 NECK MASS: ICD-10-CM

## 2023-03-02 DIAGNOSIS — Z11.59 NEED FOR HEPATITIS C SCREENING TEST: ICD-10-CM

## 2023-03-02 DIAGNOSIS — R00.2 PALPITATIONS: Primary | ICD-10-CM

## 2023-03-02 DIAGNOSIS — R41.840 ATTENTION AND CONCENTRATION DEFICIT: ICD-10-CM

## 2023-03-02 LAB — TSH SERPL DL<=0.005 MIU/L-ACNC: 0.93 UIU/ML (ref 0.3–4.2)

## 2023-03-02 PROCEDURE — 84443 ASSAY THYROID STIM HORMONE: CPT | Performed by: INTERNAL MEDICINE

## 2023-03-02 PROCEDURE — 99213 OFFICE O/P EST LOW 20 MIN: CPT | Performed by: INTERNAL MEDICINE

## 2023-03-02 PROCEDURE — 36415 COLL VENOUS BLD VENIPUNCTURE: CPT | Performed by: INTERNAL MEDICINE

## 2023-03-02 RX ORDER — MIRTAZAPINE 30 MG/1
30 TABLET, FILM COATED ORAL AT BEDTIME
Qty: 90 TABLET | Refills: 3 | Status: SHIPPED | OUTPATIENT
Start: 2023-03-02 | End: 2024-03-11

## 2023-03-02 ASSESSMENT — ANXIETY QUESTIONNAIRES
7. FEELING AFRAID AS IF SOMETHING AWFUL MIGHT HAPPEN: NEARLY EVERY DAY
GAD7 TOTAL SCORE: 15
6. BECOMING EASILY ANNOYED OR IRRITABLE: SEVERAL DAYS
3. WORRYING TOO MUCH ABOUT DIFFERENT THINGS: NEARLY EVERY DAY
GAD7 TOTAL SCORE: 15
2. NOT BEING ABLE TO STOP OR CONTROL WORRYING: NEARLY EVERY DAY
5. BEING SO RESTLESS THAT IT IS HARD TO SIT STILL: SEVERAL DAYS
IF YOU CHECKED OFF ANY PROBLEMS ON THIS QUESTIONNAIRE, HOW DIFFICULT HAVE THESE PROBLEMS MADE IT FOR YOU TO DO YOUR WORK, TAKE CARE OF THINGS AT HOME, OR GET ALONG WITH OTHER PEOPLE: VERY DIFFICULT
1. FEELING NERVOUS, ANXIOUS, OR ON EDGE: SEVERAL DAYS

## 2023-03-02 ASSESSMENT — ASTHMA QUESTIONNAIRES
QUESTION_3 LAST FOUR WEEKS HOW OFTEN DID YOUR ASTHMA SYMPTOMS (WHEEZING, COUGHING, SHORTNESS OF BREATH, CHEST TIGHTNESS OR PAIN) WAKE YOU UP AT NIGHT OR EARLIER THAN USUAL IN THE MORNING: NOT AT ALL
QUESTION_1 LAST FOUR WEEKS HOW MUCH OF THE TIME DID YOUR ASTHMA KEEP YOU FROM GETTING AS MUCH DONE AT WORK, SCHOOL OR AT HOME: A LITTLE OF THE TIME
QUESTION_4 LAST FOUR WEEKS HOW OFTEN HAVE YOU USED YOUR RESCUE INHALER OR NEBULIZER MEDICATION (SUCH AS ALBUTEROL): NOT AT ALL
ACT_TOTALSCORE: 22
QUESTION_2 LAST FOUR WEEKS HOW OFTEN HAVE YOU HAD SHORTNESS OF BREATH: ONCE OR TWICE A WEEK
QUESTION_5 LAST FOUR WEEKS HOW WOULD YOU RATE YOUR ASTHMA CONTROL: WELL CONTROLLED
ACT_TOTALSCORE: 22

## 2023-03-02 ASSESSMENT — PATIENT HEALTH QUESTIONNAIRE - PHQ9: 5. POOR APPETITE OR OVEREATING: NEARLY EVERY DAY

## 2023-03-02 ASSESSMENT — ENCOUNTER SYMPTOMS: NERVOUS/ANXIOUS: 1

## 2023-03-02 NOTE — PROGRESS NOTES
Assessment & Plan     Palpitations  She reports pre-syncope features with this - though I suspect this is benign.   - TSH with free T4 reflex; Future  - Adult Cardiac Event Monitor; Future  - TSH with free T4 reflex    Generalized anxiety disorder  Helping-cont this. Can go to 45 mg. Is gaining weight as well   - mirtazapine (REMERON) 30 MG tablet; Take 1 tablet (30 mg) by mouth At Bedtime    Attention and concentration deficit  - Adult Mental Health  Referral; Future    Neck mass  Feels like cyst -possibly submandibular salv gland source  - US Head Neck Soft Tissue; Future      Review of the result(s) of each unique test - labs  Ordering of each unique test  Prescription drug management             No follow-ups on file.    Jesus Macias MD  [facility]    Yael Reich is a 37 year old, presenting for the following health issues:  Anxiety      Anxiety  This is a chronic problem. The problem has been gradually improving. The symptoms are aggravated by stress. The treatment provided mild relief.   History of Present Illness       Reason for visit:  Heart and referal    She eats 0-1 servings of fruits and vegetables daily.She consumes 3 sweetened beverage(s) daily.She exercises with enough effort to increase her heart rate 9 or less minutes per day.  She exercises with enough effort to increase her heart rate 3 or less days per week.   She is taking medications regularly.       Anxiety Follow-Up    How are you doing with your anxiety since your last visit? Improved , MILDLY    Are you having other symptoms that might be associated with anxiety? Yes:  Palpitations    Have you had a significant life event? OTHER: currently dealing with a move     Are you feeling depressed? No    Do you have any concerns with your use of alcohol or other drugs? No    Social History     Tobacco Use     Smoking status: Every Day     Packs/day: 1.00     Years: 10.00     Pack years:  "10.00     Types: Cigarettes     Smokeless tobacco: Never     Tobacco comments:     1 PPD   Substance Use Topics     Alcohol use: No     Drug use: No     Comment: Sober as of April 22 2016     JESSICA-7 SCORE 4/20/2017 1/11/2019 3/2/2023   Total Score 13 5 15     PHQ 9/11/2020 8/20/2021 6/10/2022   PHQ-9 Total Score 20 11 10   Q9: Thoughts of better off dead/self-harm past 2 weeks Not at all Not at all Not at all     JESSICA-7  3/2/2023   1. Feeling nervous, anxious, or on edge 1   2. Not being able to stop or control worrying 3   3. Worrying too much about different things 3   4. Trouble relaxing 3   5. Being so restless that it is hard to sit still 1   6. Becoming easily annoyed or irritable 1   7. Feeling afraid, as if something awful might happen 3   JESSICA-7 Total Score 15   If you checked any problems, how difficult have they made it for you to do your work, take care of things at home, or get along with other people? Very difficult           Review of Systems   Psychiatric/Behavioral: The patient is nervous/anxious.             Objective    /83   Pulse 73   Temp 98.9  F (37.2  C) (Temporal)   Resp 20   Ht 1.676 m (5' 6\")   Wt 54.6 kg (120 lb 6.4 oz)   SpO2 97%   BMI 19.43 kg/m    Body mass index is 19.43 kg/m .  Physical Exam   GENERAL: alert and no distress  NECK: L upper neck under mandble smooth cystic mass, slightly tender. Thyroid normal  CV: regular rate and rhythm, normal S1 S2, no S3 or S4, no murmur, click or rub, no peripheral edema and peripheral pulses strong    Results for orders placed or performed in visit on 03/02/23   TSH with free T4 reflex     Status: Normal   Result Value Ref Range    TSH 0.93 0.30 - 4.20 uIU/mL                   "

## 2023-03-24 ENCOUNTER — HOSPITAL ENCOUNTER (OUTPATIENT)
Dept: CARDIOLOGY | Facility: CLINIC | Age: 38
Discharge: HOME | End: 2023-03-24
Attending: INTERNAL MEDICINE | Admitting: INTERNAL MEDICINE

## 2023-03-24 DIAGNOSIS — R00.2 PALPITATIONS: ICD-10-CM

## 2023-03-24 PROCEDURE — 93270 REMOTE 30 DAY ECG REV/REPORT: CPT

## 2023-03-24 PROCEDURE — 93272 ECG/REVIEW INTERPRET ONLY: CPT | Performed by: INTERNAL MEDICINE

## 2023-03-26 ENCOUNTER — HEALTH MAINTENANCE LETTER (OUTPATIENT)
Age: 38
End: 2023-03-26

## 2023-04-05 DIAGNOSIS — F41.1 GENERALIZED ANXIETY DISORDER: ICD-10-CM

## 2023-04-05 DIAGNOSIS — M54.50 BILATERAL LOW BACK PAIN WITHOUT SCIATICA, UNSPECIFIED CHRONICITY: ICD-10-CM

## 2023-04-05 RX ORDER — GABAPENTIN 100 MG/1
100 CAPSULE ORAL 2 TIMES DAILY
Qty: 180 CAPSULE | Refills: 0 | Status: SHIPPED | OUTPATIENT
Start: 2023-04-05 | End: 2023-07-14

## 2023-07-14 DIAGNOSIS — F41.1 GENERALIZED ANXIETY DISORDER: ICD-10-CM

## 2023-07-14 DIAGNOSIS — M54.50 BILATERAL LOW BACK PAIN WITHOUT SCIATICA, UNSPECIFIED CHRONICITY: ICD-10-CM

## 2023-07-14 RX ORDER — GABAPENTIN 100 MG/1
100 CAPSULE ORAL 2 TIMES DAILY
Qty: 180 CAPSULE | Refills: 0 | Status: SHIPPED | OUTPATIENT
Start: 2023-07-14

## 2023-10-02 ENCOUNTER — NURSE TRIAGE (OUTPATIENT)
Dept: NURSING | Facility: CLINIC | Age: 38
End: 2023-10-02

## 2023-10-02 NOTE — TELEPHONE ENCOUNTER
"\"Large bubble over L knee-cap.\"  \"Today felt a 'pop' and then a 'cool feeling'.\"  Occurred while kneeling.  \"Like something shifted.\"    \"Joint feels kind of stiff.\"  However no pain.  No redness or swelling.  \"Knee cracks a lot.\"  \"Just uncomfortable.\"    \"Knee area and down into leg feels cool with any movement.\"  \"Bubble has decreased in size.\"    Pt works as a house-.  \"Kneels a lot.\"    Discussed seeking clinical eval within a few days for potential bursa which may have burst.  Explained increased risk of infection if bursa sac has burst.    Pt reports no health insurance.  Therefore provided options for free and sliding scale clinics per [employer] resource.  Pt writes information; agrees to contact.    Also discussed self-care measures to support knee and provide cushioning during her work duties.    Mirna DAVIS [facility] Nurse Advisor     Reason for Disposition   Fluid-filled sack just below knee cap (i.e., inferior aspect of the anterior knee)    Additional Information   Negative: Followed a knee injury   Negative: Followed a bee sting and has localized swelling (e.g., small area of puffy or swollen skin)   Negative: Followed an insect bite and has localized swelling (e.g., small area of puffy or swollen skin)   Negative: Knee pain is main symptom   Negative: Swelling of calf or leg is main symptom   Negative: Knee pain and fever   Negative: Knee redness and fever   Negative: Patient sounds very sick or weak to the triager   Negative: SEVERE pain (e.g., excruciating, unable to walk) and not improved after 2 hours of pain medicine   Negative: Redness and painful when touched and no fever   Negative: Red area or streak > 2 inches (or 5 cm)   Negative: Thigh or calf pain and only 1 side and present > 1 hour   Negative: Thigh or calf swelling and only 1 side   Negative: SEVERE swelling (e.g., can't move swollen knee at all)   Negative: Looks like a boil, infected sore, deep ulcer or other infected rash " (spreading redness, pus)   Negative: Redness of the skin and no fever   Negative: Patient wants to be seen   Negative: MILD or MODERATE swelling (e.g., can't move joint normally, can't do usual activities) (Exceptions: Itchy, localized swelling; swelling is chronic.)    Protocols used: Knee Swelling-A-OH

## 2023-10-04 ENCOUNTER — OFFICE VISIT (OUTPATIENT)
Dept: URGENT CARE | Facility: URGENT CARE | Age: 38
End: 2023-10-04

## 2023-10-04 VITALS
WEIGHT: 145 LBS | OXYGEN SATURATION: 99 % | DIASTOLIC BLOOD PRESSURE: 75 MMHG | HEART RATE: 62 BPM | BODY MASS INDEX: 23.4 KG/M2 | TEMPERATURE: 97.6 F | SYSTOLIC BLOOD PRESSURE: 122 MMHG

## 2023-10-04 DIAGNOSIS — M70.52 SUPRAPATELLAR BURSITIS OF LEFT KNEE: Primary | ICD-10-CM

## 2023-10-04 PROCEDURE — 99213 OFFICE O/P EST LOW 20 MIN: CPT | Performed by: PHYSICIAN ASSISTANT

## 2023-10-04 NOTE — LETTER
October 4, 2023      Rachana Senior  [address] [place] [address]        To Whom It May Concern:    Rachana Senior  was seen on 10/4/23.  Please excuse her 10/4/23 due to illness.        Sincerely,        Alvarez Nicholson PA-C

## 2023-10-04 NOTE — LETTER
October 4, 2023      Rachana Senior  [address] [place] [address]        To Whom It May Concern:    Rachana Senior  was seen on 10/4/23.  Please excuse her on 10/4/23 and 10/5/23 due to illness.        Sincerely,        Alvarez Nicholson PA-C

## 2023-12-07 ENCOUNTER — HOSPITAL ENCOUNTER (EMERGENCY)
Facility: CLINIC | Age: 38
Discharge: HOME | End: 2023-12-07
Attending: STUDENT IN AN ORGANIZED HEALTH CARE EDUCATION/TRAINING PROGRAM | Admitting: STUDENT IN AN ORGANIZED HEALTH CARE EDUCATION/TRAINING PROGRAM

## 2023-12-07 VITALS
OXYGEN SATURATION: 97 % | BODY MASS INDEX: 24.53 KG/M2 | WEIGHT: 152 LBS | DIASTOLIC BLOOD PRESSURE: 53 MMHG | SYSTOLIC BLOOD PRESSURE: 125 MMHG | HEART RATE: 100 BPM | RESPIRATION RATE: 16 BRPM | TEMPERATURE: 98.5 F

## 2023-12-07 DIAGNOSIS — J06.9 UPPER RESPIRATORY TRACT INFECTION, UNSPECIFIED TYPE: Primary | ICD-10-CM

## 2023-12-07 DIAGNOSIS — J04.0 LARYNGITIS: ICD-10-CM

## 2023-12-07 LAB
FLUAV RNA SPEC QL NAA+PROBE: NEGATIVE
FLUBV RNA RESP QL NAA+PROBE: NEGATIVE
GROUP A STREP BY PCR: NOT DETECTED
RSV RNA SPEC NAA+PROBE: NEGATIVE
SARS-COV-2 RNA RESP QL NAA+PROBE: NEGATIVE

## 2023-12-07 PROCEDURE — 99283 EMERGENCY DEPT VISIT LOW MDM: CPT

## 2023-12-07 PROCEDURE — 87651 STREP A DNA AMP PROBE: CPT | Performed by: STUDENT IN AN ORGANIZED HEALTH CARE EDUCATION/TRAINING PROGRAM

## 2023-12-07 PROCEDURE — 87637 SARSCOV2&INF A&B&RSV AMP PRB: CPT | Performed by: STUDENT IN AN ORGANIZED HEALTH CARE EDUCATION/TRAINING PROGRAM

## 2023-12-07 ASSESSMENT — ACTIVITIES OF DAILY LIVING (ADL): ADLS_ACUITY_SCORE: 33

## 2023-12-07 NOTE — Clinical Note
Rachana Senior was seen and treated in our emergency department on 12/7/2023.  She may return to work on 12/08/2023.  She was negative for COVID, Strep Throat and influenza     If you have any questions or concerns, please don't hesitate to call.      Jayce Sesay MD

## 2023-12-08 NOTE — ED TRIAGE NOTES
Pt c/o of head congestion, cough, sore throat and chills. Pt states she took a covid test at home was negative. Pt denies taking any OTC meds today.      Triage Assessment (Adult)       Row Name 12/07/23 1946          Triage Assessment    Airway WDL WDL        Respiratory WDL    Respiratory WDL X;cough     Cough Frequency infrequent        Skin Circulation/Temperature WDL    Skin Circulation/Temperature WDL WDL        Cardiac WDL    Cardiac WDL WDL        Peripheral/Neurovascular WDL    Peripheral Neurovascular WDL WDL        Cognitive/Neuro/Behavioral WDL    Cognitive/Neuro/Behavioral WDL WDL

## 2023-12-08 NOTE — DISCHARGE INSTRUCTIONS
Thank you for allowing us to evaluate you today.  Follow up with primary care clinician  in 1-2 weeks as needed for reevaluation..  For pain, use acetaminophen (Tylenol) and ibuprofen.  Drink plenty of fluids.  Please read the guidance provided with your discharge instructions.  Immediately return to the emergency department with any concerns.

## 2023-12-08 NOTE — ED PROVIDER NOTES
History   Chief Complaint:  Nasal Congestion and Pharyngitis       HPI:  Rachana Senior is a very pleasant 38 year old female presenting with nasal congestion and pharyngitis. She reports having a sore throat, little nasal congestion, and chest tightness for a couple of days along with a voice change. She reports feeling achy and fatigue. She states having ear pain. She reports using a nasal spray for a day and a half but no other use of medications for pain. No one around her has been sick. Denies cough or shortness of breath.     Independent Historian:  None. Only the patient provided history.    Review of External Notes:  None.    I personally reviewed the patient's chart, including available medication list and available past medical history, past surgical history, family history, and social history.    Physical Exam   Patient Vitals for the past 24 hrs:   BP Temp Temp src Pulse Resp SpO2 Weight   12/07/23 1945 125/53 98.5  F (36.9  C) Oral 100 16 97 % 68.9 kg (152 lb)      Physical Exam  Vitals and nursing note reviewed.   Constitutional:       General: She is not in acute distress.     Appearance: She is well-developed. She is not ill-appearing.   HENT:      Nose: No congestion or rhinorrhea.      Mouth/Throat:      Mouth: Mucous membranes are moist. No oral lesions.      Pharynx: Oropharynx is clear. Uvula midline. No pharyngeal swelling, oropharyngeal exudate or posterior oropharyngeal erythema.      Tonsils: No tonsillar exudate or tonsillar abscesses.   Eyes:      Conjunctiva/sclera: Conjunctivae normal.   Cardiovascular:      Rate and Rhythm: Normal rate.   Pulmonary:      Effort: Pulmonary effort is normal.   Skin:     General: Skin is warm and dry.   Neurological:      Mental Status: She is alert.            Emergency Department Course     Imaging & ECG: Laboratory:   No ECG performed.    No orders to display      Report per radiology.  Labs Ordered and Resulted from Time of ED Arrival to Time of ED  Departure   INFLUENZA A/B, RSV, & SARS-COV2 PCR - Normal       Result Value    Influenza A PCR Negative      Influenza B PCR Negative      RSV PCR Negative      SARS CoV2 PCR Negative     GROUP A STREPTOCOCCUS PCR THROAT SWAB - Normal    Group A strep by PCR Not Detected           Procedures  None performed    Interventions & Assessments:     Interventions:  Medications - No data to display     Assessments:  ED Course as of 12/08/23 0139   Thu Dec 07, 2023   2054 I obtained history and examined the patient as noted above.    2101 I prepared the patient to be discharged home.      Independent Interpretation (X-rays, CTs, rhythm strip):  None    Consultations/Discussion of Management or Tests:  None    Social Determinants of Health affecting care:   None.     Disposition:  The patient was discharged to home.     Impression & Plan        Medical Decision Making:  This patient has upper respiratory infection symptoms.  Vital signs reassuring.  Low likelihood of sepsis physiology or serious pathology such as pneumonia, meningitis, bacteremia, etc., further lab and imaging work up not indicated at this time.  Likely viral URI.  Strep test was negative.  COVID and influenza testing was negative.  Plan for continued symptomatic management at home., Findings were discussed. , Additional verbal instructions were provided. , I discussed specific warning signs and instructed the patient to return to the emergency department if there are any concerns., Understanding of instructions was voiced, questions were answered and the patient was discharged.        Diagnosis:    ICD-10-CM    1. Upper respiratory tract infection, unspecified type  J06.9       2. Laryngitis  J04.0            Discharge Medications:  Discharge Medication List as of 12/7/2023  9:06 PM        Scribe Disclosure:  I, Taylor Boyce, am serving as a scribe at 9:04 PM on 12/7/2023 to document services personally performed by Jayce Sesay MD based on my  observations and the provider's statements to me.       Jayce Sesay MD  12/08/23 0137

## 2024-03-10 DIAGNOSIS — F41.1 GENERALIZED ANXIETY DISORDER: ICD-10-CM

## 2024-03-11 RX ORDER — MIRTAZAPINE 30 MG/1
30 TABLET, FILM COATED ORAL AT BEDTIME
Qty: 30 TABLET | Refills: 0 | Status: SHIPPED | OUTPATIENT
Start: 2024-03-11

## 2024-03-20 ENCOUNTER — HOSPITAL ENCOUNTER (EMERGENCY)
Facility: CLINIC | Age: 39
Discharge: HOME | End: 2024-03-20
Attending: STUDENT IN AN ORGANIZED HEALTH CARE EDUCATION/TRAINING PROGRAM | Admitting: STUDENT IN AN ORGANIZED HEALTH CARE EDUCATION/TRAINING PROGRAM

## 2024-03-20 VITALS
HEIGHT: 67 IN | RESPIRATION RATE: 20 BRPM | DIASTOLIC BLOOD PRESSURE: 72 MMHG | BODY MASS INDEX: 25.11 KG/M2 | TEMPERATURE: 97.7 F | WEIGHT: 160 LBS | SYSTOLIC BLOOD PRESSURE: 109 MMHG | OXYGEN SATURATION: 99 % | HEART RATE: 99 BPM

## 2024-03-20 DIAGNOSIS — R68.89 FLU-LIKE SYMPTOMS: ICD-10-CM

## 2024-03-20 DIAGNOSIS — J02.0 STREP THROAT: ICD-10-CM

## 2024-03-20 LAB
FLUAV RNA SPEC QL NAA+PROBE: NEGATIVE
FLUBV RNA RESP QL NAA+PROBE: NEGATIVE
GROUP A STREP BY PCR: DETECTED
RSV RNA SPEC NAA+PROBE: NEGATIVE
SARS-COV-2 RNA RESP QL NAA+PROBE: NEGATIVE

## 2024-03-20 PROCEDURE — 250N000011 HC RX IP 250 OP 636: Performed by: STUDENT IN AN ORGANIZED HEALTH CARE EDUCATION/TRAINING PROGRAM

## 2024-03-20 PROCEDURE — 87651 STREP A DNA AMP PROBE: CPT | Performed by: STUDENT IN AN ORGANIZED HEALTH CARE EDUCATION/TRAINING PROGRAM

## 2024-03-20 PROCEDURE — 87637 SARSCOV2&INF A&B&RSV AMP PRB: CPT | Performed by: STUDENT IN AN ORGANIZED HEALTH CARE EDUCATION/TRAINING PROGRAM

## 2024-03-20 PROCEDURE — 99284 EMERGENCY DEPT VISIT MOD MDM: CPT

## 2024-03-20 PROCEDURE — 250N000013 HC RX MED GY IP 250 OP 250 PS 637: Performed by: STUDENT IN AN ORGANIZED HEALTH CARE EDUCATION/TRAINING PROGRAM

## 2024-03-20 PROCEDURE — 87651 STREP A DNA AMP PROBE: CPT | Performed by: EMERGENCY MEDICINE

## 2024-03-20 RX ORDER — AMOXICILLIN 500 MG/1
1000 CAPSULE ORAL ONCE
Status: COMPLETED | OUTPATIENT
Start: 2024-03-20 | End: 2024-03-20

## 2024-03-20 RX ORDER — ONDANSETRON 4 MG/1
4 TABLET, ORALLY DISINTEGRATING ORAL EVERY 8 HOURS PRN
Qty: 10 TABLET | Refills: 0 | Status: SHIPPED | OUTPATIENT
Start: 2024-03-20 | End: 2024-03-23

## 2024-03-20 RX ORDER — IBUPROFEN 600 MG/1
600 TABLET, FILM COATED ORAL ONCE
Status: COMPLETED | OUTPATIENT
Start: 2024-03-20 | End: 2024-03-20

## 2024-03-20 RX ORDER — AMOXICILLIN 500 MG/1
1000 CAPSULE ORAL DAILY
Qty: 20 CAPSULE | Refills: 0 | Status: SHIPPED | OUTPATIENT
Start: 2024-03-20 | End: 2024-03-30

## 2024-03-20 RX ORDER — ONDANSETRON 4 MG/1
4 TABLET, ORALLY DISINTEGRATING ORAL ONCE
Status: COMPLETED | OUTPATIENT
Start: 2024-03-20 | End: 2024-03-20

## 2024-03-20 RX ADMIN — AMOXICILLIN 1000 MG: 500 CAPSULE ORAL at 22:31

## 2024-03-20 RX ADMIN — ONDANSETRON 4 MG: 4 TABLET, ORALLY DISINTEGRATING ORAL at 22:23

## 2024-03-20 RX ADMIN — IBUPROFEN 600 MG: 600 TABLET, FILM COATED ORAL at 22:32

## 2024-03-20 ASSESSMENT — COLUMBIA-SUICIDE SEVERITY RATING SCALE - C-SSRS
2. HAVE YOU ACTUALLY HAD ANY THOUGHTS OF KILLING YOURSELF IN THE PAST MONTH?: NO
6. HAVE YOU EVER DONE ANYTHING, STARTED TO DO ANYTHING, OR PREPARED TO DO ANYTHING TO END YOUR LIFE?: NO
1. IN THE PAST MONTH, HAVE YOU WISHED YOU WERE DEAD OR WISHED YOU COULD GO TO SLEEP AND NOT WAKE UP?: NO

## 2024-03-20 ASSESSMENT — ACTIVITIES OF DAILY LIVING (ADL): ADLS_ACUITY_SCORE: 33

## 2024-03-20 NOTE — LETTER
Rachana Senior was seen and treated in our emergency department on 3/20/2024.  She may return to work on 03/25/2024.       If you have any questions or concerns, please don't hesitate to call.      Nancy Jim PA-C

## 2024-03-21 NOTE — ED TRIAGE NOTES
Flu-like symptoms since yesterday. Works with children. Sinus congestion, runny nose, chest congestion, cough, body aches, headache, sinus pressure.   Took Ibuprofen this morning.      Triage Assessment (Adult)       Row Name 03/20/24 2048          Triage Assessment    Airway WDL WDL        Respiratory WDL    Respiratory WDL X;cough     Cough Frequency frequent     Cough Type congested;productive        Skin Circulation/Temperature WDL    Skin Circulation/Temperature WDL WDL        Cardiac WDL    Cardiac WDL WDL        Peripheral/Neurovascular WDL    Peripheral Neurovascular WDL WDL        Cognitive/Neuro/Behavioral WDL    Cognitive/Neuro/Behavioral WDL WDL

## 2024-03-21 NOTE — ED PROVIDER NOTES
History     Chief Complaint:  Flu Symptoms (/)       HPI   Rachana Senior is a 38 year old female with history of anxiety who presents to the emergency department with flulike symptoms since yesterday.  She reports headache, body aches, sore throat.  She has had a temperature between 99 and 100.2.  She has been congested but has not been coughing much.  Today she started having vomiting and dry heaving.  No diarrhea.  No dysuria, hematuria, or urinary frequency.  No shortness of breath or chest pain.  She took ibuprofen this morning, but has not had anything since, due to nausea.  She has been able to keep water down without difficulty.  Patient reports been otherwise healthy.  Former smoker.  No known sick contacts.    Independent Historian:   None - Patient Only    Review of External Notes:   I reviewed urgent care note from April 2023 when she was seen for accidental needlestick injury.    Medications:    amoxicillin (AMOXIL) 500 MG capsule  ondansetron (ZOFRAN ODT) 4 MG ODT tab  albuterol (PROAIR HFA/PROVENTIL HFA/VENTOLIN HFA) 108 (90 Base) MCG/ACT inhaler  cromolyn (OPTICROM) 4 % ophthalmic solution  EYE ITCH RELIEF 0.025 % ophthalmic solution  fluticasone (FLONASE) 50 MCG/ACT nasal spray  gabapentin (NEURONTIN) 100 MG capsule  mirtazapine (REMERON) 30 MG tablet        Past Medical History:    Past Medical History:   Diagnosis Date    Depressive disorder, not elsewhere classified 8/9/2006    GENERALIZED ANXIETY DIS 10/31/2006    Insufficient prenatal care 6/20/2006    Mild hyperemesis gravidarum, antepartum 6/27/2006    Seizures (H)     Tobacco use disorder 8/9/2006    Unspecified asthma(493.90)        Past Surgical History:    Past Surgical History:   Procedure Laterality Date    ENT SURGERY      tonsils    GYN SURGERY      tubal    SURGICAL HISTORY OF -   2001    T&A        Physical Exam   Patient Vitals for the past 24 hrs:   BP Temp Temp src Pulse Resp SpO2 Height Weight   03/20/24 2046 109/72 97.7  F  "(36.5  C) Temporal 99 20 99 % 1.702 m (5' 7\") 72.6 kg (160 lb)        Physical Exam  Vital signs and nursing notes reviewed.    General:  Alert and oriented, no acute distress. Resting on bed with boyfriend at bedside.   Skin: Skin is warm and dry. No diaphoresis.  HEENT:   Head: Normocephalic, atraumatic. Facial features symmetric.   Eyes: Conjunctiva pink, sclera white. EOMs grossly intact.   Ears: Auricles without lesion, erythema, or edema.   Nose: Symmetric with no discharge.  Mouth and throat: Lips are moist with no lesions or edema, oropharynx is erythematous, without lesions or exudate.  No tonsils.  Uvula is midline.  Neck: Normal range of motion. Neck supple with no lymphadenopathy.   CV:  Heart RRR with no murmurs or extra heart sounds. 2+ radial and tibialis posterior pulses bilaterally. No peripheral edema.  Pulm/Chest: Chest wall expansion symmetric with no increased effort of breathing. Lungs clear and equal to auscultation bilaterally.   Abd: Bowel sounds present and physiologic. Abdomen is soft and nontender to palpation in all 4 quadrants with no guarding or rebound.   M/S: Moves all extremities spontaneously.  Psych: Normal mood and affect. Behavior is normal.      Emergency Department Course     Imaging:  No orders to display      Laboratory:  Labs Ordered and Resulted from Time of ED Arrival to Time of ED Departure   GROUP A STREPTOCOCCUS PCR THROAT SWAB - Abnormal       Result Value    Group A strep by PCR Detected (*)    INFLUENZA A/B, RSV, & SARS-COV2 PCR - Normal    Influenza A PCR Negative      Influenza B PCR Negative      RSV PCR Negative      SARS CoV2 PCR Negative        Procedures   none    Emergency Department Course & Assessments:    Interventions:  Medications   ondansetron (ZOFRAN ODT) ODT tab 4 mg (4 mg Oral $Given 3/20/24 2223)   ibuprofen (ADVIL/MOTRIN) tablet 600 mg (600 mg Oral $Given 3/20/24 2232)   amoxicillin (AMOXIL) capsule 1,000 mg (1,000 mg Oral $Given 3/20/24 2231)    "   Independent Interpretation (X-rays, CTs, rhythm strip):  None    Consultations/Discussion of Management or Tests:  None        Social Determinants of Health affecting care:   None    Disposition:  The patient was discharged.     Impression & Plan    CMS Diagnoses: None    MIPS (If applicable):  N/A    Medical Decision Making:  Rachana Senior is a 38 year old female who presents for evaluation of flulike symptoms including sore throat, headache, vomiting since yesterday.  See HPI.  Vital signs are reassuring.  On exam, she is nontoxic.  Her posterior oropharynx is moderately erythematous, however the patient does not have tonsils.  There is no uvula deviation or evidence of abscess.  Neck is supple and there is no cervical lymphadenopathy.  Her lungs are clear to auscultation and there is no evidence of otitis media.  Abdomen is benign.  She is found to be positive for strep throat.  COVID, flu, RSV testing negative.  There is no evidence of sepsis, meningitis, bacteremia at this time.  Using reasonable clinical judgment, I feel the patient is safe for discharge home.  She is not hypoxic, nontoxic, and is tolerating oral intake.  She received first dose of antibiotics here and will be sent home with amoxicillin for strep throat, Zofran for nausea and vomiting, and instructions on Tylenol and ibuprofen use.  They are instructed to follow-up with your primary care provider for recheck in 3 days and return to the ED should she develop difficulty breathing or swallowing, persistently high fevers, persistent vomiting, blood in her emesis, abdominal pain or bloody diarrhea, or any further emergent concerns.  Patient and her boyfriend in the room are agreeable and had questions answered.    Diagnosis:    ICD-10-CM    1. Strep throat  J02.0       2. Flu-like symptoms  R68.89            Discharge Medications:  New Prescriptions    AMOXICILLIN (AMOXIL) 500 MG CAPSULE    Take 2 capsules (1,000 mg) by mouth daily for 10 days     ONDANSETRON (ZOFRAN ODT) 4 MG ODT TAB    Take 1 tablet (4 mg) by mouth every 8 hours as needed for nausea      Nancy Jim PA-C on 3/20/2024 at 10:59 PM         Nancy Jim PA-C  03/20/24 2585

## 2024-03-21 NOTE — DISCHARGE INSTRUCTIONS
Take 1000 mg of Tylenol every ~6 hours for aches/fever.  Do not exceed 4000 mg in 24 hours.  Take 600 mg of ibuprofen every 6-8 hours for aches/fever.  Do not exceed 2400 mg in a 24-hour period.  Take Zofran for nausea and vomiting.  Take antibiotics as prescribed for strep throat.  Follow-up with primary care for recheck as needed and return to the ED should you develop difficulty breathing or swallowing, weakness or confusion, shortness of breath, or any further emergent concerns.  I hope you feel better soon!

## 2024-05-26 ENCOUNTER — HEALTH MAINTENANCE LETTER (OUTPATIENT)
Age: 39
End: 2024-05-26

## 2025-04-29 ENCOUNTER — APPOINTMENT (OUTPATIENT)
Dept: CT IMAGING | Facility: CLINIC | Age: 40
End: 2025-04-29
Attending: EMERGENCY MEDICINE

## 2025-04-29 ENCOUNTER — APPOINTMENT (OUTPATIENT)
Dept: ULTRASOUND IMAGING | Facility: CLINIC | Age: 40
End: 2025-04-29
Attending: EMERGENCY MEDICINE

## 2025-04-29 ENCOUNTER — HOSPITAL ENCOUNTER (EMERGENCY)
Facility: CLINIC | Age: 40
Discharge: HOME | End: 2025-04-29
Attending: EMERGENCY MEDICINE | Admitting: EMERGENCY MEDICINE

## 2025-04-29 VITALS
BODY MASS INDEX: 23.17 KG/M2 | HEART RATE: 87 BPM | TEMPERATURE: 98.6 F | HEIGHT: 66 IN | SYSTOLIC BLOOD PRESSURE: 114 MMHG | WEIGHT: 144.18 LBS | RESPIRATION RATE: 17 BRPM | OXYGEN SATURATION: 100 % | DIASTOLIC BLOOD PRESSURE: 75 MMHG

## 2025-04-29 DIAGNOSIS — R19.7 DIARRHEA, UNSPECIFIED TYPE: ICD-10-CM

## 2025-04-29 DIAGNOSIS — N83.209 HEMORRHAGIC OVARIAN CYST: ICD-10-CM

## 2025-04-29 DIAGNOSIS — R10.30 LOWER ABDOMINAL PAIN: ICD-10-CM

## 2025-04-29 LAB
ALBUMIN UR-MCNC: NEGATIVE MG/DL
ANION GAP SERPL CALCULATED.3IONS-SCNC: 11 MMOL/L (ref 7–15)
APPEARANCE UR: CLEAR
BASOPHILS # BLD AUTO: 0 10E3/UL (ref 0–0.2)
BASOPHILS NFR BLD AUTO: 0 %
BILIRUB UR QL STRIP: NEGATIVE
BUN SERPL-MCNC: 12.3 MG/DL (ref 6–20)
CALCIUM SERPL-MCNC: 8.8 MG/DL (ref 8.8–10.4)
CHLORIDE SERPL-SCNC: 105 MMOL/L (ref 98–107)
COLOR UR AUTO: ABNORMAL
CREAT SERPL-MCNC: 0.71 MG/DL (ref 0.51–0.95)
EGFRCR SERPLBLD CKD-EPI 2021: >90 ML/MIN/1.73M2
EOSINOPHIL # BLD AUTO: 0 10E3/UL (ref 0–0.7)
EOSINOPHIL NFR BLD AUTO: 1 %
ERYTHROCYTE [DISTWIDTH] IN BLOOD BY AUTOMATED COUNT: 13.2 % (ref 10–15)
GLUCOSE SERPL-MCNC: 77 MG/DL (ref 70–99)
GLUCOSE UR STRIP-MCNC: NEGATIVE MG/DL
HCG SERPL QL: NEGATIVE
HCO3 SERPL-SCNC: 23 MMOL/L (ref 22–29)
HCT VFR BLD AUTO: 44.7 % (ref 35–47)
HGB BLD-MCNC: 15.2 G/DL (ref 11.7–15.7)
HGB UR QL STRIP: NEGATIVE
HOLD SPECIMEN: NORMAL
IMM GRANULOCYTES # BLD: 0 10E3/UL
IMM GRANULOCYTES NFR BLD: 0 %
KETONES UR STRIP-MCNC: 10 MG/DL
LEUKOCYTE ESTERASE UR QL STRIP: NEGATIVE
LYMPHOCYTES # BLD AUTO: 0.7 10E3/UL (ref 0.8–5.3)
LYMPHOCYTES NFR BLD AUTO: 20 %
MCH RBC QN AUTO: 30.5 PG (ref 26.5–33)
MCHC RBC AUTO-ENTMCNC: 34 G/DL (ref 31.5–36.5)
MCV RBC AUTO: 90 FL (ref 78–100)
MONOCYTES # BLD AUTO: 0.4 10E3/UL (ref 0–1.3)
MONOCYTES NFR BLD AUTO: 11 %
MUCOUS THREADS #/AREA URNS LPF: PRESENT /LPF
NEUTROPHILS # BLD AUTO: 2.5 10E3/UL (ref 1.6–8.3)
NEUTROPHILS NFR BLD AUTO: 68 %
NITRATE UR QL: NEGATIVE
NRBC # BLD AUTO: 0 10E3/UL
NRBC BLD AUTO-RTO: 0 /100
PH UR STRIP: 5 [PH] (ref 5–7)
PLATELET # BLD AUTO: 196 10E3/UL (ref 150–450)
POTASSIUM SERPL-SCNC: 3.7 MMOL/L (ref 3.4–5.3)
RBC # BLD AUTO: 4.98 10E6/UL (ref 3.8–5.2)
RBC URINE: 0 /HPF
SODIUM SERPL-SCNC: 139 MMOL/L (ref 135–145)
SP GR UR STRIP: 1.01 (ref 1–1.03)
SQUAMOUS EPITHELIAL: 9 /HPF
UROBILINOGEN UR STRIP-MCNC: NORMAL MG/DL
WBC # BLD AUTO: 3.7 10E3/UL (ref 4–11)
WBC URINE: 1 /HPF

## 2025-04-29 PROCEDURE — 96374 THER/PROPH/DIAG INJ IV PUSH: CPT | Mod: 59

## 2025-04-29 PROCEDURE — 250N000011 HC RX IP 250 OP 636: Performed by: EMERGENCY MEDICINE

## 2025-04-29 PROCEDURE — 99285 EMERGENCY DEPT VISIT HI MDM: CPT | Mod: 25

## 2025-04-29 PROCEDURE — 85004 AUTOMATED DIFF WBC COUNT: CPT | Performed by: EMERGENCY MEDICINE

## 2025-04-29 PROCEDURE — 250N000009 HC RX 250: Performed by: EMERGENCY MEDICINE

## 2025-04-29 PROCEDURE — 258N000003 HC RX IP 258 OP 636: Performed by: EMERGENCY MEDICINE

## 2025-04-29 PROCEDURE — 76856 US EXAM PELVIC COMPLETE: CPT

## 2025-04-29 PROCEDURE — 250N000013 HC RX MED GY IP 250 OP 250 PS 637: Performed by: EMERGENCY MEDICINE

## 2025-04-29 PROCEDURE — 84703 CHORIONIC GONADOTROPIN ASSAY: CPT | Performed by: EMERGENCY MEDICINE

## 2025-04-29 PROCEDURE — 80048 BASIC METABOLIC PNL TOTAL CA: CPT | Performed by: EMERGENCY MEDICINE

## 2025-04-29 PROCEDURE — 36415 COLL VENOUS BLD VENIPUNCTURE: CPT | Performed by: EMERGENCY MEDICINE

## 2025-04-29 PROCEDURE — 96361 HYDRATE IV INFUSION ADD-ON: CPT

## 2025-04-29 PROCEDURE — 74177 CT ABD & PELVIS W/CONTRAST: CPT

## 2025-04-29 PROCEDURE — 81003 URINALYSIS AUTO W/O SCOPE: CPT | Performed by: EMERGENCY MEDICINE

## 2025-04-29 RX ORDER — IBUPROFEN 600 MG/1
600 TABLET, FILM COATED ORAL ONCE
Status: COMPLETED | OUTPATIENT
Start: 2025-04-29 | End: 2025-04-29

## 2025-04-29 RX ORDER — OXYCODONE HYDROCHLORIDE 5 MG/1
5 TABLET ORAL ONCE
Status: COMPLETED | OUTPATIENT
Start: 2025-04-29 | End: 2025-04-29

## 2025-04-29 RX ORDER — ONDANSETRON 4 MG/1
4 TABLET, ORALLY DISINTEGRATING ORAL ONCE
Status: COMPLETED | OUTPATIENT
Start: 2025-04-29 | End: 2025-04-29

## 2025-04-29 RX ORDER — ONDANSETRON 4 MG/1
4 TABLET, ORALLY DISINTEGRATING ORAL EVERY 8 HOURS PRN
Qty: 10 TABLET | Refills: 0 | Status: SHIPPED | OUTPATIENT
Start: 2025-04-29 | End: 2025-05-02

## 2025-04-29 RX ORDER — ONDANSETRON 2 MG/ML
4 INJECTION INTRAMUSCULAR; INTRAVENOUS ONCE
Status: COMPLETED | OUTPATIENT
Start: 2025-04-29 | End: 2025-04-29

## 2025-04-29 RX ORDER — IOPAMIDOL 755 MG/ML
500 INJECTION, SOLUTION INTRAVASCULAR ONCE
Status: COMPLETED | OUTPATIENT
Start: 2025-04-29 | End: 2025-04-29

## 2025-04-29 RX ADMIN — ONDANSETRON 4 MG: 4 TABLET, ORALLY DISINTEGRATING ORAL at 23:48

## 2025-04-29 RX ADMIN — OXYCODONE HYDROCHLORIDE 5 MG: 5 TABLET ORAL at 18:42

## 2025-04-29 RX ADMIN — ONDANSETRON 4 MG: 2 INJECTION, SOLUTION INTRAMUSCULAR; INTRAVENOUS at 18:42

## 2025-04-29 RX ADMIN — OXYCODONE HYDROCHLORIDE 5 MG: 5 TABLET ORAL at 20:52

## 2025-04-29 RX ADMIN — IBUPROFEN 600 MG: 600 TABLET ORAL at 18:42

## 2025-04-29 RX ADMIN — SODIUM CHLORIDE 1000 ML: 0.9 INJECTION, SOLUTION INTRAVENOUS at 18:42

## 2025-04-29 RX ADMIN — IOPAMIDOL 72 ML: 755 INJECTION, SOLUTION INTRAVENOUS at 20:05

## 2025-04-29 RX ADMIN — SODIUM CHLORIDE 58 ML: 9 INJECTION, SOLUTION INTRAVENOUS at 20:05

## 2025-04-29 ASSESSMENT — ACTIVITIES OF DAILY LIVING (ADL)
ADLS_ACUITY_SCORE: 41

## 2025-04-29 NOTE — ED PROVIDER NOTES
"Emergency Department Note      Code Status: No Order    History of Present Illness     Chief Complaint:  Flank Pain       HPI   Rachana Senior is a 39 year old female presenting with lower back pain. The patient reports that she woke up Saturday, 3 days ago, with excessive pain in the bilateral lower back. The pain radiates down to the hips/thighs. The day before she had felt weak and stayed home from work. She developed intermittent lower bilateral abdominal/flank pain 1-2 days ago and also reports diarrhea, an episode of vomiting this morning, and a sensation of a fast heart rate. She has history of L5 fracture around a decade ago but has never had these symptoms before. She had a negative at-home flu and Covid test. Denies recent injury or unusual foods. No dysuria or hematuria, no nausea.    Independent Historian:    None    Review of External Notes  No recent admit or DC summary. Most recent ED provider note 3/20/24 for flu like symptoms. Strep +    Past Medical History   Medical History, Surgical History, Problem List, and Medications  Reviewed in Epic    Physical Exam   Patient Vitals for the past 24 hrs:   BP Temp Pulse Resp SpO2 Height Weight   04/29/25 2352 114/75 -- 87 17 100 % -- --   04/29/25 1731 117/79 -- 110 -- -- -- --   04/29/25 1730 -- 98.6  F (37  C) -- 22 99 % 1.676 m (5' 6\") 65.4 kg (144 lb 2.9 oz)       Physical Exam  Constitutional: Vital signs reviewed as above.   Eyes: PEERL, EOMI B/L  Neck: No JVD noted. FROM   Cardiovascular: tachycardic rate, Regular rhythm and normal heart sounds.  No murmur heard. Equal B/L peripheral pulses.  Pulmonary/Chest: Effort normal and breath sounds normal. No respiratory distress. Patient has no wheezes. Patient has no rales.   Gastrointestinal: Soft. There is minimal direct B/L LQ tenderness. Palpation of the B/L LQ elicits pain in the low back  Musculoskeletal/Extremities: No pitting edema noted. There is right CVA tenderness to percussion. No significant " midline L-spine TTP (Patient notes frequent discomfort there due to L5 Fx 10 years ago)  Skin: Skin is warm and dry. There is no diaphoresis noted.   Psychiatric: The patient appears calm.     Diagnostics     Laboratory: Imaging:   Labs Ordered and Resulted from Time of ED Arrival to Time of ED Departure   ROUTINE UA WITH MICROSCOPIC REFLEX TO CULTURE - Abnormal       Result Value    Color Urine Light Yellow      Appearance Urine Clear      Glucose Urine Negative      Bilirubin Urine Negative      Ketones Urine 10 (*)     Specific Gravity Urine 1.013      Blood Urine Negative      pH Urine 5.0      Protein Albumin Urine Negative      Urobilinogen Urine Normal      Nitrite Urine Negative      Leukocyte Esterase Urine Negative      Mucus Urine Present (*)     RBC Urine 0      WBC Urine 1      Squamous Epithelials Urine 9 (*)    CBC WITH PLATELETS AND DIFFERENTIAL - Abnormal    WBC Count 3.7 (*)     RBC Count 4.98      Hemoglobin 15.2      Hematocrit 44.7      MCV 90      MCH 30.5      MCHC 34.0      RDW 13.2      Platelet Count 196      % Neutrophils 68      % Lymphocytes 20      % Monocytes 11      % Eosinophils 1      % Basophils 0      % Immature Granulocytes 0      NRBCs per 100 WBC 0      Absolute Neutrophils 2.5      Absolute Lymphocytes 0.7 (*)     Absolute Monocytes 0.4      Absolute Eosinophils 0.0      Absolute Basophils 0.0      Absolute Immature Granulocytes 0.0      Absolute NRBCs 0.0     BASIC METABOLIC PANEL - Normal    Sodium 139      Potassium 3.7      Chloride 105      Carbon Dioxide (CO2) 23      Anion Gap 11      Urea Nitrogen 12.3      Creatinine 0.71      GFR Estimate >90      Calcium 8.8      Glucose 77     HCG QUALITATIVE PREGNANCY - Normal    hCG Serum Qualitative Negative       US Pelvis Cmplt w Transvag & Doppler LmtPel Duplex Limited   Final Result   IMPRESSION:     1.  1.2 cm complex left ovarian cyst most compatible with a hemorrhagic cyst. No specific follow-up needed unless clinically  warranted.      2.  Right ovary is negative. Blood flow demonstrated to both ovaries.      3.  Tiny 3 mm echogenic focus within the uterus may be due to a tiny calcification and is of doubtful significance. Uterus is otherwise unremarkable.      CT Abdomen Pelvis w Contrast   Final Result   IMPRESSION:    1.  Left-sided corpus luteal cyst, with small volume of free pelvic fluid.   2.  No hydronephrosis or perinephric collections.            EKG   None    Independent Interpretation  See ED course    ED Course    Medications Administered  Medications   oxyCODONE (ROXICODONE) tablet 5 mg (5 mg Oral $Given 4/29/25 1842)   ibuprofen (ADVIL/MOTRIN) tablet 600 mg (600 mg Oral $Given 4/29/25 1842)   sodium chloride 0.9% BOLUS 1,000 mL (0 mLs Intravenous Stopped 4/29/25 2011)   ondansetron (ZOFRAN) injection 4 mg (4 mg Intravenous $Given 4/29/25 1842)   sodium chloride 0.9 % bag for CT scan flush (58 mLs Intravenous $Given 4/29/25 2005)   iopamidol (ISOVUE-370) solution 500 mL (72 mLs Intravenous $Given 4/29/25 2005)   oxyCODONE (ROXICODONE) tablet 5 mg (5 mg Oral $Given 4/29/25 2052)   ondansetron (ZOFRAN ODT) ODT tab 4 mg (4 mg Oral $Given 4/29/25 2348)       Procedures  None     Discussion of Management  See ED Course    ED Course  ED Course as of 04/30/25 0142   Tue Apr 29, 2025 1811 Initial evaluation.   2127 I rechecked and updated the patient.    2341 I rechecked and updated the patient again.       Optional/Additional Documentation: None    Medical Decision Making / Diagnosis     MIPS     None    Medical Decision Making:  This 39-year-old presents due to several symptoms.  Please see the HPI and exam for specifics.  The differential is broad and does include urinary/renal infection, intra-abdominal pathologies, pelvic related issues, low back problems such as sciatica, etc.  The above workup was undertaken.    The patient is not pregnant.  Labs are otherwise reassuring.  There is slight leukopenia.  Urine is not  infected.  CT demonstrates a left ovarian cyst and ultrasound confirms this.  There is no evidence of torsion.  This is likely a ruptured hemorrhagic ovarian cyst.  The patient otherwise felt well enough to be discharged.  I will encourage primary care follow-up and consideration for gynecology follow-up and possible repeat ultrasound in 6 weeks.     Critical Care:  None.    Disposition:  See ED Course and MDM    ICD-10 Codes:    ICD-10-CM    1. Lower abdominal pain  R10.30       2. Hemorrhagic ovarian cyst  N83.209       3. Diarrhea, unspecified type  R19.7            Discharge Medications:  Discharge Medication List as of 4/29/2025 11:45 PM        START taking these medications    Details   ondansetron (ZOFRAN ODT) 4 MG ODT tab Take 1 tablet (4 mg) by mouth every 8 hours as needed for nausea or vomiting., Disp-10 tablet, R-0, E-Prescribe              4/29/2025   Acosta Milan DO     [employer]                    Acosta Milan DO  04/30/25 0143

## 2025-04-29 NOTE — ED TRIAGE NOTES
Patient presents to ED with complaints of flank pain and generalized weakness. Hx of kidney infections.

## 2025-04-30 NOTE — DISCHARGE INSTRUCTIONS
What do you do next:   Continue your home medications unless we have specifically changed them  If medications were prescribed today, take these as directed.  You can use over-the-counter acetaminophen (Tylenol ) and ibuprofen for fever or pain control as applicable to your visit today.  Acetaminophen (Tylenol): Take 500 to 1000 mg by mouth every 6 hours as needed for fever or pain.  Do not take more than 4000 total milligrams of acetaminophen-containing products in a 24-hour timeframe.  Ibuprofen: Take 600 milligrams by mouth every 6-8 hours as needed for fever or pain.  Take this with food or milk to avoid stomach upset. (Next dose: 1242 AM)  Follow up as indicated below    When do you return: Review your discharge papers for specifics on reasons to return.    Thank you for allowing us to care for you today.

## 2025-06-14 ENCOUNTER — HEALTH MAINTENANCE LETTER (OUTPATIENT)
Age: 40
End: 2025-06-14

## (undated) RX ORDER — ONDANSETRON 4 MG/1
TABLET, ORALLY DISINTEGRATING ORAL
Status: DISPENSED
Start: 2024-03-20